# Patient Record
Sex: FEMALE | Race: WHITE | NOT HISPANIC OR LATINO | Employment: FULL TIME | URBAN - NONMETROPOLITAN AREA
[De-identification: names, ages, dates, MRNs, and addresses within clinical notes are randomized per-mention and may not be internally consistent; named-entity substitution may affect disease eponyms.]

---

## 2019-05-16 ENCOUNTER — OFFICE VISIT (OUTPATIENT)
Dept: NEUROLOGY | Facility: CLINIC | Age: 59
End: 2019-05-16

## 2019-05-16 VITALS
BODY MASS INDEX: 25.61 KG/M2 | WEIGHT: 150 LBS | DIASTOLIC BLOOD PRESSURE: 80 MMHG | SYSTOLIC BLOOD PRESSURE: 130 MMHG | HEART RATE: 72 BPM | HEIGHT: 64 IN

## 2019-05-16 DIAGNOSIS — R13.10 DYSPHAGIA, UNSPECIFIED TYPE: ICD-10-CM

## 2019-05-16 DIAGNOSIS — R43.0 ANOSMIA: ICD-10-CM

## 2019-05-16 DIAGNOSIS — R43.2 DYSGEUSIA: ICD-10-CM

## 2019-05-16 DIAGNOSIS — R20.0 FACIAL NUMBNESS: ICD-10-CM

## 2019-05-16 DIAGNOSIS — G50.1 ATYPICAL FACIAL PAIN: Primary | ICD-10-CM

## 2019-05-16 PROCEDURE — 99203 OFFICE O/P NEW LOW 30 MIN: CPT | Performed by: PHYSICIAN ASSISTANT

## 2019-05-16 RX ORDER — PNV NO.95/FERROUS FUM/FOLIC AC 28MG-0.8MG
TABLET ORAL DAILY
COMMUNITY

## 2019-05-16 RX ORDER — VERAPAMIL HYDROCHLORIDE 240 MG/1
240 TABLET, FILM COATED, EXTENDED RELEASE ORAL NIGHTLY
COMMUNITY
End: 2021-03-04 | Stop reason: SDUPTHER

## 2019-06-13 ENCOUNTER — TELEPHONE (OUTPATIENT)
Dept: NEUROLOGY | Facility: CLINIC | Age: 59
End: 2019-06-13

## 2019-06-13 NOTE — TELEPHONE ENCOUNTER
Called and left pt a VM stating that the MRI order was placed today and she should be receiving a call from scheduling soon.

## 2019-06-13 NOTE — TELEPHONE ENCOUNTER
Patient states she was supposed to be scheduled for an MRI and when she called scheduling to ask about it they did not have the order. Please put order in and call patient.

## 2019-06-13 NOTE — PROGRESS NOTES
Subjective   Candida Powell is a 59 y.o. female is being seen for consultation today at the request of Fidel Hoskins MD    The patient has had symptoms of a neurologic nature for greater than a year.  Patient initially suffered from some headache which involved then into numbness in a trigeminal distribution on the left.  Patient relates that her sense of smell and taste has been altered.  She has had occasional swallowing issues which have not been progressive.  She has not had visual symptoms.  There is been no loss of consciousness.  She has not had any advanced imaging of her brain.      Neurologic Problem   The patient's primary symptoms include focal sensory loss, focal weakness and weakness. This is a new problem. The current episode started more than 1 year ago. The neurological problem developed gradually. The problem is unchanged. There was left-sided focality noted. Associated symptoms include fatigue and headaches. Past treatments include nothing.        The following portions of the patient's history were reviewed and updated as appropriate: allergies, current medications, past family history, past medical history, past social history, past surgical history and problem list.    Review of Systems   Constitutional: Positive for fatigue.   HENT: Positive for trouble swallowing.    Eyes: Negative.    Respiratory: Negative.    Cardiovascular: Negative.    Gastrointestinal: Negative.    Endocrine: Negative.    Genitourinary: Negative.    Musculoskeletal: Negative.    Skin: Negative.    Allergic/Immunologic: Negative.    Neurological: Positive for focal weakness, weakness, numbness and headache.        Anosmia, dysgeusia   Hematological: Negative.    Psychiatric/Behavioral: Negative.        Objective   Physical Exam   Constitutional: She is oriented to person, place, and time.   HENT:   Head: Normocephalic.   Right Ear: Hearing and external ear normal.   Left Ear: Hearing and external ear normal.    Nose: Nose normal.   Mouth/Throat: Uvula is midline, oropharynx is clear and moist and mucous membranes are normal.   Eyes: Conjunctivae, EOM and lids are normal. Pupils are equal, round, and reactive to light.   Fundoscopic exam:       The right eye shows no hemorrhage and no papilledema. The right eye shows red reflex.        The left eye shows no hemorrhage and no papilledema. The left eye shows red reflex.   Neck: Trachea normal, normal range of motion, full passive range of motion without pain and phonation normal. Neck supple. No JVD present. Carotid bruit is not present.   Cardiovascular: Normal rate, regular rhythm and normal heart sounds.   Pulmonary/Chest: Effort normal and breath sounds normal.   Neurological: She is alert and oriented to person, place, and time. She has normal strength. She displays no atrophy and no tremor. A cranial nerve deficit and sensory deficit is present. She exhibits normal muscle tone. Coordination and gait normal. GCS eye subscore is 4. GCS verbal subscore is 5. GCS motor subscore is 6.   Reflex Scores:       Tricep reflexes are 2+ on the right side and 2+ on the left side.       Bicep reflexes are 2+ on the right side and 2+ on the left side.       Brachioradialis reflexes are 2+ on the right side and 2+ on the left side.       Patellar reflexes are 2+ on the right side and 2+ on the left side.       Achilles reflexes are 2+ on the right side and 2+ on the left side.  Discrepancy in light touch from left to right, subjective diminished since of smell and altered taste.   Skin: Skin is warm, dry and intact.   Psychiatric: She has a normal mood and affect. Her speech is normal and behavior is normal. Judgment and thought content normal. Cognition and memory are normal.   Nursing note and vitals reviewed.        Assessment/Plan   Candida was seen today for neurologic problem.    Diagnoses and all orders for this visit:    Atypical facial pain  -     MRI Brain With & Without  Contrast; Future    Facial numbness  -     MRI Brain With & Without Contrast; Future    Dysphagia, unspecified type  -     MRI Brain With & Without Contrast; Future    Dysgeusia  -     MRI Brain With & Without Contrast; Future    Anosmia  -     MRI Brain With & Without Contrast; Future    Concern for intracranial lesion such as olfactory groove meningioma or similar.  Exam is not revealing but the symptoms are substantial.    Today's findings, recommendations, follow-up recommendations the patient questions are reviewed in detail.           Dictated utilizing Dragon dictation.

## 2019-06-28 ENCOUNTER — HOSPITAL ENCOUNTER (OUTPATIENT)
Dept: MRI IMAGING | Facility: HOSPITAL | Age: 59
Discharge: HOME | End: 2019-06-28
Admitting: PHYSICIAN ASSISTANT

## 2019-06-28 DIAGNOSIS — R43.0 ANOSMIA: ICD-10-CM

## 2019-06-28 DIAGNOSIS — R13.10 DYSPHAGIA, UNSPECIFIED TYPE: ICD-10-CM

## 2019-06-28 DIAGNOSIS — G50.1 ATYPICAL FACIAL PAIN: ICD-10-CM

## 2019-06-28 DIAGNOSIS — R43.2 DYSGEUSIA: ICD-10-CM

## 2019-06-28 DIAGNOSIS — R20.0 FACIAL NUMBNESS: ICD-10-CM

## 2019-06-28 LAB — CREAT BLDA-MCNC: 0.8 MG/DL (ref 0.6–1.3)

## 2019-06-28 PROCEDURE — 0 GADOBENATE DIMEGLUMINE 529 MG/ML SOLUTION: Performed by: PHYSICIAN ASSISTANT

## 2019-06-28 PROCEDURE — A9577 INJ MULTIHANCE: HCPCS | Performed by: PHYSICIAN ASSISTANT

## 2019-06-28 PROCEDURE — 82565 ASSAY OF CREATININE: CPT

## 2019-06-28 PROCEDURE — 70553 MRI BRAIN STEM W/O & W/DYE: CPT

## 2019-06-28 RX ADMIN — GADOBENATE DIMEGLUMINE 20 ML: 529 INJECTION, SOLUTION INTRAVENOUS at 08:25

## 2019-07-03 DIAGNOSIS — I63.9 BRAINSTEM STROKE (HCC): Primary | ICD-10-CM

## 2019-07-09 DIAGNOSIS — I63.9 BRAINSTEM STROKE (HCC): Primary | ICD-10-CM

## 2019-07-15 ENCOUNTER — HOSPITAL ENCOUNTER (OUTPATIENT)
Dept: CARDIOLOGY | Facility: HOSPITAL | Age: 59
Discharge: HOME | End: 2019-07-15
Admitting: PHYSICIAN ASSISTANT

## 2019-07-15 ENCOUNTER — HOSPITAL ENCOUNTER (OUTPATIENT)
Dept: CARDIOLOGY | Facility: HOSPITAL | Age: 59
Discharge: HOME | End: 2019-07-15

## 2019-07-15 VITALS
HEIGHT: 64 IN | SYSTOLIC BLOOD PRESSURE: 137 MMHG | WEIGHT: 150 LBS | BODY MASS INDEX: 25.61 KG/M2 | DIASTOLIC BLOOD PRESSURE: 75 MMHG

## 2019-07-15 DIAGNOSIS — I63.9 BRAINSTEM STROKE (HCC): ICD-10-CM

## 2019-07-15 LAB
BH CV ECHO MEAS - AO MAX PG (FULL): 3.1 MMHG
BH CV ECHO MEAS - AO MAX PG: 7.6 MMHG
BH CV ECHO MEAS - AO MEAN PG (FULL): 2 MMHG
BH CV ECHO MEAS - AO MEAN PG: 4 MMHG
BH CV ECHO MEAS - AO ROOT AREA (BSA CORRECTED): 1.6
BH CV ECHO MEAS - AO ROOT AREA: 5.7 CM^2
BH CV ECHO MEAS - AO ROOT DIAM: 2.7 CM
BH CV ECHO MEAS - AO V2 MAX: 138 CM/SEC
BH CV ECHO MEAS - AO V2 MEAN: 98.5 CM/SEC
BH CV ECHO MEAS - AO V2 VTI: 32 CM
BH CV ECHO MEAS - AVA(I,A): 2.3 CM^2
BH CV ECHO MEAS - AVA(I,D): 2.3 CM^2
BH CV ECHO MEAS - AVA(V,A): 2.2 CM^2
BH CV ECHO MEAS - AVA(V,D): 2.2 CM^2
BH CV ECHO MEAS - BSA(HAYCOCK): 1.8 M^2
BH CV ECHO MEAS - BSA: 1.7 M^2
BH CV ECHO MEAS - BZI_BMI: 25.7 KILOGRAMS/M^2
BH CV ECHO MEAS - BZI_METRIC_HEIGHT: 162.6 CM
BH CV ECHO MEAS - BZI_METRIC_WEIGHT: 68 KG
BH CV ECHO MEAS - EDV(CUBED): 58 ML
BH CV ECHO MEAS - EDV(MOD-SP4): 59.7 ML
BH CV ECHO MEAS - EDV(TEICH): 64.7 ML
BH CV ECHO MEAS - EF(CUBED): 77 %
BH CV ECHO MEAS - EF(MOD-SP4): 67.7 %
BH CV ECHO MEAS - EF(TEICH): 69.8 %
BH CV ECHO MEAS - ESV(CUBED): 13.3 ML
BH CV ECHO MEAS - ESV(MOD-SP4): 19.3 ML
BH CV ECHO MEAS - ESV(TEICH): 19.5 ML
BH CV ECHO MEAS - FS: 38.8 %
BH CV ECHO MEAS - IVS/LVPW: 1
BH CV ECHO MEAS - IVSD: 0.87 CM
BH CV ECHO MEAS - LA DIMENSION: 3.1 CM
BH CV ECHO MEAS - LA/AO: 1.1
BH CV ECHO MEAS - LAT PEAK E' VEL: 7.7 CM/SEC
BH CV ECHO MEAS - LV DIASTOLIC VOL/BSA (35-75): 34.5 ML/M^2
BH CV ECHO MEAS - LV MASS(C)D: 98.6 GRAMS
BH CV ECHO MEAS - LV MASS(C)DI: 56.9 GRAMS/M^2
BH CV ECHO MEAS - LV MAX PG: 4.5 MMHG
BH CV ECHO MEAS - LV MEAN PG: 2 MMHG
BH CV ECHO MEAS - LV SYSTOLIC VOL/BSA (12-30): 11.2 ML/M^2
BH CV ECHO MEAS - LV V1 MAX: 106 CM/SEC
BH CV ECHO MEAS - LV V1 MEAN: 69.1 CM/SEC
BH CV ECHO MEAS - LV V1 VTI: 25.7 CM
BH CV ECHO MEAS - LVIDD: 3.9 CM
BH CV ECHO MEAS - LVIDS: 2.4 CM
BH CV ECHO MEAS - LVLD AP4: 7.1 CM
BH CV ECHO MEAS - LVLS AP4: 6.1 CM
BH CV ECHO MEAS - LVOT AREA (M): 2.8 CM^2
BH CV ECHO MEAS - LVOT AREA: 2.8 CM^2
BH CV ECHO MEAS - LVOT DIAM: 1.9 CM
BH CV ECHO MEAS - LVPWD: 0.86 CM
BH CV ECHO MEAS - MED PEAK E' VEL: 8.49 CM/SEC
BH CV ECHO MEAS - MV A MAX VEL: 66 CM/SEC
BH CV ECHO MEAS - MV DEC TIME: 0.18 SEC
BH CV ECHO MEAS - MV E MAX VEL: 95.1 CM/SEC
BH CV ECHO MEAS - MV E/A: 1.4
BH CV ECHO MEAS - RAP SYSTOLE: 5 MMHG
BH CV ECHO MEAS - RVSP: 22.3 MMHG
BH CV ECHO MEAS - SI(AO): 105.9 ML/M^2
BH CV ECHO MEAS - SI(CUBED): 25.8 ML/M^2
BH CV ECHO MEAS - SI(LVOT): 42.1 ML/M^2
BH CV ECHO MEAS - SI(MOD-SP4): 23.3 ML/M^2
BH CV ECHO MEAS - SI(TEICH): 26.1 ML/M^2
BH CV ECHO MEAS - SV(AO): 183.2 ML
BH CV ECHO MEAS - SV(CUBED): 44.6 ML
BH CV ECHO MEAS - SV(LVOT): 72.9 ML
BH CV ECHO MEAS - SV(MOD-SP4): 40.4 ML
BH CV ECHO MEAS - SV(TEICH): 45.2 ML
BH CV ECHO MEAS - TR MAX VEL: 208 CM/SEC
BH CV ECHO MEASUREMENTS AVERAGE E/E' RATIO: 11.75
LEFT ATRIUM VOLUME INDEX: 26.4 ML/M2
LEFT ATRIUM VOLUME: 45.6 CM3
MAXIMAL PREDICTED HEART RATE: 159 BPM
STRESS TARGET HR: 135 BPM

## 2019-07-15 PROCEDURE — 0296T HC EXT ECG > 48HR TO 21 DAY RCRD W/CONECT INTL RCRD: CPT

## 2019-07-15 PROCEDURE — 93306 TTE W/DOPPLER COMPLETE: CPT | Performed by: INTERNAL MEDICINE

## 2019-07-15 PROCEDURE — 93306 TTE W/DOPPLER COMPLETE: CPT

## 2019-08-09 PROCEDURE — 0298T PR EXT ECG > 48HR TO 21 DAY REVIEW AND INTERPRETATN: CPT | Performed by: INTERNAL MEDICINE

## 2019-08-29 ENCOUNTER — OFFICE VISIT (OUTPATIENT)
Dept: NEUROLOGY | Facility: CLINIC | Age: 59
End: 2019-08-29

## 2019-08-29 VITALS
DIASTOLIC BLOOD PRESSURE: 62 MMHG | HEIGHT: 64 IN | WEIGHT: 153 LBS | SYSTOLIC BLOOD PRESSURE: 112 MMHG | BODY MASS INDEX: 26.12 KG/M2 | RESPIRATION RATE: 18 BRPM | HEART RATE: 72 BPM

## 2019-08-29 DIAGNOSIS — I63.9 BRAINSTEM STROKE (HCC): Primary | ICD-10-CM

## 2019-08-29 PROBLEM — I49.9 CARDIAC ARRHYTHMIA: Status: ACTIVE | Noted: 2019-08-29

## 2019-08-29 PROBLEM — E78.2 MIXED HYPERLIPIDEMIA: Status: ACTIVE | Noted: 2019-08-29

## 2019-08-29 PROCEDURE — 99214 OFFICE O/P EST MOD 30 MIN: CPT | Performed by: PHYSICIAN ASSISTANT

## 2019-08-29 RX ORDER — ROSUVASTATIN CALCIUM 5 MG/1
5 TABLET, COATED ORAL DAILY
COMMUNITY
End: 2021-03-04 | Stop reason: SDUPTHER

## 2019-09-24 NOTE — PROGRESS NOTES
Subjective   Candida Powell is a 59 y.o. female is here today for follow-up.    The patient has had symptoms of a neurologic nature for greater than a year.  Patient initially suffered from some headache which involved then into numbness in a trigeminal distribution on the left.  Patient relates that her sense of smell and taste has been altered.  She has had occasional swallowing issues which have not been progressive.  She has not had visual symptoms.    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  Small vessel disease is also noted.  Echocardiogram is unrevealing.  Holter monitor has not revealed any arrhythmia.      Neurologic Problem   The patient's primary symptoms include focal sensory loss, focal weakness and weakness. This is a new problem. The current episode started more than 1 year ago. The neurological problem developed gradually. The problem is unchanged. There was left-sided focality noted. Associated symptoms include fatigue and headaches. Past treatments include nothing.        The following portions of the patient's history were reviewed and updated as appropriate: allergies, current medications, past family history, past medical history, past social history, past surgical history and problem list.    Review of Systems   Constitutional: Positive for fatigue.   HENT: Positive for trouble swallowing.    Eyes: Negative.    Respiratory: Negative.    Cardiovascular: Negative.    Gastrointestinal: Negative.    Endocrine: Negative.    Genitourinary: Negative.    Musculoskeletal: Negative.    Skin: Negative.    Allergic/Immunologic: Negative.    Neurological: Positive for focal weakness, weakness, numbness and headache.        Anosmia, dysgeusia   Hematological: Negative.    Psychiatric/Behavioral: Negative.        Objective   Physical Exam   Constitutional: She is oriented to person, place, and time.   HENT:   Head: Normocephalic.   Right Ear: Hearing and external ear normal.   Left Ear:  Hearing and external ear normal.   Nose: Nose normal.   Mouth/Throat: Uvula is midline, oropharynx is clear and moist and mucous membranes are normal.   Eyes: Conjunctivae, EOM and lids are normal. Pupils are equal, round, and reactive to light.   Neck: Trachea normal, normal range of motion and phonation normal. No JVD present. Carotid bruit is not present.   Cardiovascular: Normal rate, regular rhythm and normal heart sounds.   Pulmonary/Chest: Effort normal and breath sounds normal.   Neurological: She is alert and oriented to person, place, and time. She has normal strength. She displays no atrophy and no tremor. A cranial nerve deficit and sensory deficit is present. She exhibits normal muscle tone. Coordination and gait normal. GCS eye subscore is 4. GCS verbal subscore is 5. GCS motor subscore is 6.   Reflex Scores:       Tricep reflexes are 2+ on the right side and 2+ on the left side.       Bicep reflexes are 2+ on the right side and 2+ on the left side.       Brachioradialis reflexes are 2+ on the right side and 2+ on the left side.       Patellar reflexes are 2+ on the right side and 2+ on the left side.       Achilles reflexes are 2+ on the right side and 2+ on the left side.  Discrepancy in light touch from left to right, subjective diminished since of smell and altered taste.   Skin: Skin is warm, dry and intact.   Psychiatric: She has a normal mood and affect. Her speech is normal and behavior is normal. Judgment and thought content normal. Cognition and memory are normal.   Nursing note and vitals reviewed.        Assessment/Plan   Candida was seen today for neurologic problem.    Diagnoses and all orders for this visit:    Brainstem stroke (CMS/HCC)  -     aspirin 81 MG tablet; Take 1 tablet by mouth Daily.    Today's findings and recommendations are reviewed with the patient.  The patient's questions and concerns are also reviewed.  Previous imaging studies are reviewed in the room with the  patient.      20 minutes of a 25 minute outpatient visit was spent in counseling and coordination of care today.           Dictated utilizing Dragon dictation.

## 2020-02-18 ENCOUNTER — TELEPHONE (OUTPATIENT)
Dept: NEUROLOGY | Facility: CLINIC | Age: 60
End: 2020-02-18

## 2020-02-18 NOTE — TELEPHONE ENCOUNTER
----- Message from AIXA Connor sent at 2/18/2020 10:13 AM CST -----  1) if acute changes offer ER evaluation 2) I can see her sooner       ----- Message -----  From: Rhona Gross LPN  Sent: 2/18/2020   9:05 AM CST  To: AIXA Connor    Candida said last night her right arm felt like it was asleep, then had a burning sensation.  She also had pain shoot through her head and felt like she was going to pass out.  Her arm still has a burning sensation today.  She is concerned and said something is wrong.    [phone]. [phone]

## 2020-02-25 ENCOUNTER — TRANSCRIBE ORDERS (OUTPATIENT)
Dept: ADMINISTRATIVE | Facility: HOSPITAL | Age: 60
End: 2020-02-25

## 2020-02-25 DIAGNOSIS — M50.10 CERVICAL DISC DISORDER WITH RADICULOPATHY OF CERVICAL REGION: Primary | ICD-10-CM

## 2020-02-25 DIAGNOSIS — M50.10 CERVICAL DISC DISORDER WITH RADICULOPATHY, UNSPECIFIED CERVICAL REGION: Primary | ICD-10-CM

## 2020-02-28 ENCOUNTER — HOSPITAL ENCOUNTER (OUTPATIENT)
Dept: GENERAL RADIOLOGY | Facility: HOSPITAL | Age: 60
Discharge: HOME | End: 2020-02-28
Admitting: FAMILY MEDICINE

## 2020-02-28 DIAGNOSIS — M50.10 CERVICAL DISC DISORDER WITH RADICULOPATHY OF CERVICAL REGION: ICD-10-CM

## 2020-02-28 PROCEDURE — 72050 X-RAY EXAM NECK SPINE 4/5VWS: CPT

## 2020-03-02 ENCOUNTER — HOSPITAL ENCOUNTER (OUTPATIENT)
Dept: NEUROLOGY | Facility: HOSPITAL | Age: 60
Discharge: HOME | End: 2020-03-02
Admitting: FAMILY MEDICINE

## 2020-03-02 PROCEDURE — 95886 MUSC TEST DONE W/N TEST COMP: CPT

## 2020-03-02 PROCEDURE — 95911 NRV CNDJ TEST 9-10 STUDIES: CPT

## 2020-03-05 ENCOUNTER — OFFICE VISIT (OUTPATIENT)
Dept: NEUROLOGY | Facility: CLINIC | Age: 60
End: 2020-03-05

## 2020-03-05 VITALS
HEART RATE: 83 BPM | HEIGHT: 64 IN | BODY MASS INDEX: 26.12 KG/M2 | SYSTOLIC BLOOD PRESSURE: 122 MMHG | DIASTOLIC BLOOD PRESSURE: 74 MMHG | WEIGHT: 153 LBS

## 2020-03-05 DIAGNOSIS — I63.9 BRAINSTEM STROKE (HCC): Primary | ICD-10-CM

## 2020-03-05 PROCEDURE — 99213 OFFICE O/P EST LOW 20 MIN: CPT | Performed by: PHYSICIAN ASSISTANT

## 2020-03-25 NOTE — PROGRESS NOTES
Subjective   Candida Powell is a 59 y.o. female is here today for follow-up.    The patient has had symptoms of a neurologic nature for greater than a year.  Patient initially suffered from some headache which involved then into numbness in a trigeminal distribution on the left.  Patient relates that her sense of smell and taste has been altered.  She has had occasional swallowing issues which have not been progressive.  She has not had visual symptoms.    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  Small vessel disease is also noted.  Echocardiogram is unrevealing.  Holter monitor has not revealed any arrhythmia.    Neurologic Problem   The patient's primary symptoms include focal sensory loss, focal weakness and weakness. This is a new problem. The current episode started more than 1 year ago. The neurological problem developed gradually. The problem is unchanged. There was left-sided focality noted. Associated symptoms include fatigue, headaches and light-headedness. Past treatments include nothing.        The following portions of the patient's history were reviewed and updated as appropriate: allergies, current medications, past family history, past medical history, past social history, past surgical history and problem list.    Review of Systems   Constitutional: Positive for fatigue.   HENT: Positive for trouble swallowing.    Eyes: Negative.    Respiratory: Negative.    Cardiovascular: Negative.    Gastrointestinal: Negative.    Endocrine: Negative.    Genitourinary: Negative.    Musculoskeletal: Negative.    Skin: Negative.    Allergic/Immunologic: Negative.    Neurological: Positive for focal weakness, weakness, light-headedness, numbness and headache.        Anosmia, dysgeusia   Hematological: Negative.    Psychiatric/Behavioral: Negative.        Objective   Physical Exam   Constitutional: She is oriented to person, place, and time. Vital signs are normal.   HENT:   Head: Normocephalic.    Right Ear: Hearing and external ear normal.   Left Ear: Hearing and external ear normal.   Nose: Nose normal.   Mouth/Throat: Uvula is midline, oropharynx is clear and moist and mucous membranes are normal.   Eyes: Pupils are equal, round, and reactive to light. Conjunctivae, EOM and lids are normal. No scleral icterus.   Neck: Trachea normal, normal range of motion and phonation normal. No JVD present. Carotid bruit is not present.   Cardiovascular: Normal rate, regular rhythm and normal heart sounds.   Pulmonary/Chest: Effort normal and breath sounds normal.   Neurological: She is oriented to person, place, and time. She has normal strength. She displays no atrophy and no tremor. A cranial nerve deficit and sensory deficit is present. She exhibits normal muscle tone. Coordination and gait normal. GCS eye subscore is 4. GCS verbal subscore is 5. GCS motor subscore is 6.   Reflex Scores:       Tricep reflexes are 2+ on the right side and 2+ on the left side.       Bicep reflexes are 2+ on the right side and 2+ on the left side.       Brachioradialis reflexes are 2+ on the right side and 2+ on the left side.       Patellar reflexes are 2+ on the right side and 2+ on the left side.       Achilles reflexes are 2+ on the right side and 2+ on the left side.  Discrepancy in light touch from left to right, subjective diminished since of smell and altered taste.   Skin: Skin is warm, dry and intact.   Psychiatric: She has a normal mood and affect. Her speech is normal and behavior is normal. Judgment and thought content normal. Cognition and memory are normal.   Nursing note and vitals reviewed.        Assessment/Plan   Candida was seen today for stroke.    Diagnoses and all orders for this visit:    Brainstem stroke (CMS/[facility])  -     MRI Brain With & Without Contrast; Future  -     US Carotid Bilateral; Future    Today's findings and recommendations were reviewed with the patient.  The patient has exacerbation of sensory  symptoms which are very reminiscent of her previous symptoms related to her brainstem stroke.  Given the persistence and subjective increase, I have recommended a follow-up MRI of the brain.  I have also recommended a carotid ultrasound.  The patient's condition, expectations, medications as well as the patient's questions and concerns were reviewed in detail.      10 minutes of a 15 minute outpatient visit was spent in counseling and coordination of care today.           Dictated utilizing Dragon dictation.

## 2020-05-08 ENCOUNTER — HOSPITAL ENCOUNTER (OUTPATIENT)
Dept: ULTRASOUND IMAGING | Facility: HOSPITAL | Age: 60
Discharge: HOME | End: 2020-05-08

## 2020-05-08 ENCOUNTER — HOSPITAL ENCOUNTER (OUTPATIENT)
Dept: MRI IMAGING | Facility: HOSPITAL | Age: 60
Discharge: HOME | End: 2020-05-08
Admitting: PHYSICIAN ASSISTANT

## 2020-05-08 DIAGNOSIS — I63.9 BRAINSTEM STROKE (HCC): ICD-10-CM

## 2020-05-08 LAB — CREAT BLDA-MCNC: 0.9 MG/DL (ref 0.6–1.3)

## 2020-05-08 PROCEDURE — A9577 INJ MULTIHANCE: HCPCS | Performed by: PHYSICIAN ASSISTANT

## 2020-05-08 PROCEDURE — 70553 MRI BRAIN STEM W/O & W/DYE: CPT

## 2020-05-08 PROCEDURE — 93880 EXTRACRANIAL BILAT STUDY: CPT | Performed by: SURGERY

## 2020-05-08 PROCEDURE — 93880 EXTRACRANIAL BILAT STUDY: CPT

## 2020-05-08 PROCEDURE — 82565 ASSAY OF CREATININE: CPT

## 2020-05-08 PROCEDURE — 0 GADOBENATE DIMEGLUMINE 529 MG/ML SOLUTION: Performed by: PHYSICIAN ASSISTANT

## 2020-05-08 RX ADMIN — GADOBENATE DIMEGLUMINE 14 ML: 529 INJECTION, SOLUTION INTRAVENOUS at 08:25

## 2020-05-12 ENCOUNTER — TELEPHONE (OUTPATIENT)
Dept: NEUROLOGY | Facility: CLINIC | Age: 60
End: 2020-05-12

## 2020-05-12 NOTE — TELEPHONE ENCOUNTER
Patient called and wanted to know what her test results were from 5/8 and would like for someone to call her back and discuss the results with her.       She had 2 tests completed that day. Can someone please give her a call and discuss the finding with her?       Candida Powell   Cell: [phone]    WORK: [phone] ext. [phone]

## 2020-05-14 ENCOUNTER — TELEPHONE (OUTPATIENT)
Dept: NEUROLOGY | Facility: CLINIC | Age: 60
End: 2020-05-14

## 2020-05-14 NOTE — TELEPHONE ENCOUNTER
----- Message from AIXA Connor sent at 5/13/2020  3:44 PM CDT -----  She needs a follow up soon - either video or here      ----- Message -----  From: Rhona Gross LPN  Sent: 5/13/2020   3:23 PM CDT  To: AIXA Connor    Candida wants to know what is causing her symptoms.  She has tingling, pins and needles, feels like her arm is on fire, and has a severe pain in her head that feels like an electrical current going through it.  She talked to a nurse practitioner at work and they recommended a work up for Lyme disease.   ----- Message -----  From: Dragan Florez PA  Sent: 5/13/2020   2:36 PM CDT  To: Rhona Gross LPN    Carotid arteries show very mild narrowing, left a little more than right, no other treatment than to stay on ASA daily and check again next year.  The MRI is unchanged from previous.      ----- Message -----  From: Rhona Gross LPN  Sent: 5/12/2020   1:26 PM CDT  To: AIXA Connor    Candida would like the carotid ultrasound and MRI results.

## 2020-06-10 ENCOUNTER — APPOINTMENT (OUTPATIENT)
Dept: MRI IMAGING | Facility: HOSPITAL | Age: 60
End: 2020-06-10

## 2020-06-10 ENCOUNTER — APPOINTMENT (OUTPATIENT)
Dept: ULTRASOUND IMAGING | Facility: HOSPITAL | Age: 60
End: 2020-06-10

## 2020-08-31 ENCOUNTER — OFFICE VISIT (OUTPATIENT)
Dept: NEUROLOGY | Facility: CLINIC | Age: 60
End: 2020-08-31

## 2020-08-31 VITALS
HEIGHT: 64 IN | SYSTOLIC BLOOD PRESSURE: 120 MMHG | HEART RATE: 78 BPM | DIASTOLIC BLOOD PRESSURE: 72 MMHG | BODY MASS INDEX: 26.29 KG/M2 | OXYGEN SATURATION: 99 % | WEIGHT: 154 LBS

## 2020-08-31 DIAGNOSIS — I63.9 BRAINSTEM STROKE (HCC): Primary | ICD-10-CM

## 2020-08-31 PROCEDURE — 99214 OFFICE O/P EST MOD 30 MIN: CPT | Performed by: PHYSICIAN ASSISTANT

## 2020-09-07 NOTE — PROGRESS NOTES
Subjective   Candida Powell is a 60 y.o. female is here today for follow-up.    The patient has had symptoms of a neurologic nature for greater than a year.  Patient initially suffered from some headache which involved then into numbness in a trigeminal distribution on the left.  Patient relates that her sense of smell and taste has been altered.  She has had occasional swallowing issues which have not been progressive.  She has not had visual symptoms.    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  Small vessel disease is also noted.  Echocardiogram is unrevealing.  Holter monitor has not revealed any arrhythmia.    Recently the patient has had some issues with an electrical sensation in the face and upper extremity which has resolved completely.  This may be in fact related to her old infarct.    Neurologic Problem   The patient's primary symptoms include focal sensory loss, focal weakness and weakness. This is a new problem. The current episode started more than 1 year ago. The neurological problem developed gradually. The problem is unchanged. There was left-sided focality noted. Associated symptoms include fatigue, headaches and light-headedness. Past treatments include nothing.        The following portions of the patient's history were reviewed and updated as appropriate: allergies, current medications, past family history, past medical history, past social history, past surgical history and problem list.    Review of Systems   Constitutional: Positive for fatigue.   HENT: Positive for trouble swallowing.    Eyes: Negative.    Respiratory: Negative.    Cardiovascular: Negative.    Gastrointestinal: Negative.    Endocrine: Negative.    Genitourinary: Negative.    Musculoskeletal: Negative.    Skin: Negative.    Allergic/Immunologic: Negative.    Neurological: Positive for focal weakness, weakness, light-headedness, numbness and headache.        Anosmia, dysgeusia   Hematological: Negative.     Psychiatric/Behavioral: Negative.          Current Outpatient Medications:   •  aspirin 81 MG tablet, Take 1 tablet by mouth Daily., Disp: , Rfl:   •  Krill Oil Omega-3 500 MG capsule, Take  by mouth Daily., Disp: , Rfl:   •  rosuvastatin (CRESTOR) 5 MG tablet, Take 5 mg by mouth Daily., Disp: , Rfl:   •  verapamil SR (CALAN-SR) 240 MG CR tablet, Take 240 mg by mouth Every Night., Disp: , Rfl:      Objective   Physical Exam   Constitutional: She is oriented to person, place, and time. Vital signs are normal.   HENT:   Head: Normocephalic.   Right Ear: Hearing and external ear normal.   Left Ear: Hearing and external ear normal.   Nose: Nose normal.   Mouth/Throat: Uvula is midline, oropharynx is clear and moist and mucous membranes are normal.   Eyes: Pupils are equal, round, and reactive to light. Conjunctivae, EOM and lids are normal. No scleral icterus.   Neck: Trachea normal, normal range of motion and phonation normal. No JVD present. Carotid bruit is not present.   Cardiovascular: Normal rate, regular rhythm and normal heart sounds.   Pulmonary/Chest: Effort normal and breath sounds normal.   Neurological: She is oriented to person, place, and time. She has normal strength. She displays no atrophy and no tremor. A cranial nerve deficit and sensory deficit is present. She exhibits normal muscle tone. Coordination and gait normal. GCS eye subscore is 4. GCS verbal subscore is 5. GCS motor subscore is 6.   Reflex Scores:       Tricep reflexes are 2+ on the right side and 2+ on the left side.       Bicep reflexes are 2+ on the right side and 2+ on the left side.       Brachioradialis reflexes are 2+ on the right side and 2+ on the left side.       Patellar reflexes are 2+ on the right side and 2+ on the left side.       Achilles reflexes are 2+ on the right side and 2+ on the left side.  Discrepancy in light touch from left to right, subjective diminished since of smell and altered taste.   Skin: Skin is warm,  dry and intact.   Psychiatric: She has a normal mood and affect. Her speech is normal and behavior is normal. Judgment and thought content normal. Cognition and memory are normal.   Nursing note and vitals reviewed.        Assessment/Plan   Candida was seen today for stroke.    Diagnoses and all orders for this visit:    Brainstem stroke (CMS/HCC)    Discussed the patient's symptoms in detail.  Recommend that she continue with aspirin and Crestor.    The nature of her infarct and the potential for resurgent symptoms are reviewed.  Patient also is aware that recurrent stroke is a possibility and we have discussed some signs or symptoms that may be attributable to recurrent stroke.  The patient stroke risk is definitely improved with aspirin and Crestor adherence.    Patient's questions and concerns were also reviewed in detail.  Follow-up is established.      20 minutes of a 25 minute outpatient visit was spent in counseling and coordination of care today.           Dictated utilizing Dragon dictation.

## 2020-10-05 ENCOUNTER — TELEPHONE (OUTPATIENT)
Dept: GASTROENTEROLOGY | Facility: CLINIC | Age: 60
End: 2020-10-05

## 2020-10-05 NOTE — TELEPHONE ENCOUNTER
SPOKE WITH PT ABOUT BEING DUE FOR A REPEAT COLONOSCOPY. SHE SAID SHE IS GOING TO PUT OFF HAVING IT UNTIL AFTER COVID IS OVER.    LETTER SENT TO PCP.

## 2021-02-25 ENCOUNTER — TRANSCRIBE ORDERS (OUTPATIENT)
Dept: ADMINISTRATIVE | Facility: HOSPITAL | Age: 61
End: 2021-02-25

## 2021-02-25 DIAGNOSIS — I69.80 UNSPECIFIED SEQUELAE OF OTHER CEREBROVASCULAR DISEASE: Primary | ICD-10-CM

## 2021-03-01 ENCOUNTER — HOSPITAL ENCOUNTER (OUTPATIENT)
Dept: CT IMAGING | Facility: HOSPITAL | Age: 61
Discharge: HOME | End: 2021-03-01

## 2021-03-01 DIAGNOSIS — I69.80 UNSPECIFIED SEQUELAE OF OTHER CEREBROVASCULAR DISEASE: ICD-10-CM

## 2021-03-01 LAB — CREAT BLDA-MCNC: 0.8 MG/DL (ref 0.6–1.3)

## 2021-03-01 PROCEDURE — 0 IOPAMIDOL PER 1 ML: Performed by: FAMILY MEDICINE

## 2021-03-01 PROCEDURE — 70498 CT ANGIOGRAPHY NECK: CPT

## 2021-03-01 PROCEDURE — 82565 ASSAY OF CREATININE: CPT

## 2021-03-01 PROCEDURE — 70496 CT ANGIOGRAPHY HEAD: CPT

## 2021-03-01 RX ADMIN — IOPAMIDOL 89 ML: 755 INJECTION, SOLUTION INTRAVENOUS at 17:18

## 2021-03-03 ENCOUNTER — TRANSCRIBE ORDERS (OUTPATIENT)
Dept: ADMINISTRATIVE | Facility: HOSPITAL | Age: 61
End: 2021-03-03

## 2021-03-03 DIAGNOSIS — E04.1 NONTOXIC SINGLE THYROID NODULE: Primary | ICD-10-CM

## 2021-03-04 ENCOUNTER — OFFICE VISIT (OUTPATIENT)
Dept: NEUROLOGY | Facility: CLINIC | Age: 61
End: 2021-03-04

## 2021-03-04 ENCOUNTER — HOSPITAL ENCOUNTER (OUTPATIENT)
Dept: ULTRASOUND IMAGING | Facility: HOSPITAL | Age: 61
Discharge: HOME | End: 2021-03-04
Admitting: FAMILY MEDICINE

## 2021-03-04 VITALS
BODY MASS INDEX: 26.29 KG/M2 | HEART RATE: 67 BPM | HEIGHT: 64 IN | SYSTOLIC BLOOD PRESSURE: 122 MMHG | WEIGHT: 154 LBS | OXYGEN SATURATION: 98 % | DIASTOLIC BLOOD PRESSURE: 70 MMHG

## 2021-03-04 DIAGNOSIS — I63.9 BRAINSTEM STROKE (HCC): Primary | ICD-10-CM

## 2021-03-04 DIAGNOSIS — E04.1 NONTOXIC SINGLE THYROID NODULE: ICD-10-CM

## 2021-03-04 DIAGNOSIS — I65.22 ULCERATED ATHEROSCLEROTIC PLAQUE OF LEFT CAROTID ARTERY: ICD-10-CM

## 2021-03-04 PROCEDURE — 76536 US EXAM OF HEAD AND NECK: CPT

## 2021-03-04 PROCEDURE — 99214 OFFICE O/P EST MOD 30 MIN: CPT | Performed by: PHYSICIAN ASSISTANT

## 2021-03-05 ENCOUNTER — TELEPHONE (OUTPATIENT)
Dept: VASCULAR SURGERY | Facility: CLINIC | Age: 61
End: 2021-03-05

## 2021-03-05 NOTE — TELEPHONE ENCOUNTER
Spoke with patient and advised of upcoming appointment with Dr. Payne and mailed reminder to patient confirmed address on file.

## 2021-03-17 ENCOUNTER — TELEPHONE (OUTPATIENT)
Dept: VASCULAR SURGERY | Facility: CLINIC | Age: 61
End: 2021-03-17

## 2021-03-17 NOTE — TELEPHONE ENCOUNTER
Left message reminding Mrs Powell of her appointment for Thursday, March 18th, 2021 at 930 am with Dr Payne. Also advised Mrs Powell if she had any questions, concerns, or needs to reschedule to please call the office at [phone].        Test Comp [id number]

## 2021-03-18 ENCOUNTER — OFFICE VISIT (OUTPATIENT)
Dept: VASCULAR SURGERY | Facility: CLINIC | Age: 61
End: 2021-03-18

## 2021-03-18 VITALS
DIASTOLIC BLOOD PRESSURE: 84 MMHG | HEART RATE: 86 BPM | SYSTOLIC BLOOD PRESSURE: 136 MMHG | OXYGEN SATURATION: 95 % | WEIGHT: 155 LBS | BODY MASS INDEX: 26.46 KG/M2 | HEIGHT: 64 IN

## 2021-03-18 DIAGNOSIS — I65.23 BILATERAL CAROTID ARTERY STENOSIS: Primary | ICD-10-CM

## 2021-03-18 DIAGNOSIS — E78.2 MIXED HYPERLIPIDEMIA: ICD-10-CM

## 2021-03-18 DIAGNOSIS — I10 ESSENTIAL HYPERTENSION: ICD-10-CM

## 2021-03-18 PROCEDURE — 99214 OFFICE O/P EST MOD 30 MIN: CPT | Performed by: NURSE PRACTITIONER

## 2021-03-23 ENCOUNTER — HOSPITAL ENCOUNTER (OUTPATIENT)
Dept: MRI IMAGING | Facility: HOSPITAL | Age: 61
Discharge: HOME | End: 2021-03-23
Admitting: PHYSICIAN ASSISTANT

## 2021-03-23 DIAGNOSIS — I63.9 BRAINSTEM STROKE (HCC): ICD-10-CM

## 2021-03-23 PROCEDURE — 70553 MRI BRAIN STEM W/O & W/DYE: CPT

## 2021-03-23 PROCEDURE — A9577 INJ MULTIHANCE: HCPCS | Performed by: PHYSICIAN ASSISTANT

## 2021-03-23 PROCEDURE — 0 GADOBENATE DIMEGLUMINE 529 MG/ML SOLUTION: Performed by: PHYSICIAN ASSISTANT

## 2021-03-23 RX ADMIN — GADOBENATE DIMEGLUMINE 14 ML: 529 INJECTION, SOLUTION INTRAVENOUS at 08:42

## 2021-03-25 ENCOUNTER — TELEPHONE (OUTPATIENT)
Dept: NEUROLOGY | Facility: CLINIC | Age: 61
End: 2021-03-25

## 2021-03-25 NOTE — TELEPHONE ENCOUNTER
----- Message from AIXA Connor sent at 3/25/2021  3:16 PM CDT -----  Her MRI is the same - no new or worrisome findings

## 2021-03-25 NOTE — TELEPHONE ENCOUNTER
11/28/2017       RE: Mitesh Tovar  [address] [place] [address]     Dear Colleague,    Thank you for referring your patient, Mitesh Tovar, to the [facility] at [facility]. Please see a copy of my visit note below.    [facility]  November 28, 2017         Assessment and Plan:   Mitesh Tovar is a 32 year old male with cystic fibrosis.    1. CF lung disease with normal spirometry:  The majority of Mitesh's symptoms remain consistent with more of a bronchospasm.  He does continue to use Symbicort b.i.d. as well as an albuterol inhaler prior to exercise.  He says that this has given him excellent disease stability.  He has had no exacerbations or any concerns during the last year's time.  Today he did have a chest x-ray that was reviewed by me and Radiology which showed a normal-appearing chest.  His last sputum culture grew out only normal ariel.  I do await today's results.  Today we discussed continuing on the albuterol as needed for exercise.  He certainly could consider backing off on his Symbicort if he is overall feeling that his symptoms are stable.  He can certainly go to once daily or try to come off of it completely.  If he does not tolerate it, we discussed reinitiating the therapy.  We talked about long-term side effects of the use of these inhaler therapies, and I do think his risk is minimal at this time.    2.  CFTR modulator therapy.  Mitesh and I once again discussed the use of ivacaftor.  Given his overall disease stability and normal chest x-ray, we have chosen again not to initiate this therapy.  We also talked about new therapies that certainly may be available for patients with CF in the future including triple combination therapy.   3.  Health care maintenance.  Mitesh declined flu shot today.  He did obtain annual studies which were reviewed by me with him.   Candida notified.    He did have a low vitamin D level.  I think it would be best if he initiated vitamin D supplementation.  Additionally, he did have a mildly elevated LDL and certainly could address this as well.    4.  Low bone mineral density.  Mitesh did show evidence of bone mineral density below the mean.  I have again recommended that initiation of vitamin D would be appropriate.   5.  Psychosocial.  Mitesh is .  He continues to work as an .  He reports that things are going quite well.  He did run the [place] in 4 hours 28 minutes.   6. Pancreatic sufficiency:  The patient has no new symptoms consistent with worsening malabsorption.  The plan is to initiate vitamin supplementation.    Carole Ward MD MPH   of Medicine  Pulmonary, Allergy, Critical Care and Sleep Medicine      Interval History:     Mitesh denies any cough or sputum production.  He is only using inhaler therapy.  There is clearly no indication for other forms of bronchial drainage therapy at this time.  He has no shortness of breath.          Review of Systems:     All questionnaires were reviewed by me today with the patient.  Review of Systems     Constitutional:  Negative for fever, chills, weight loss, weight gain, fatigue, decreased appetite, night sweats, recent stressors, height gain, height loss, post-operative complications, incisional pain, hallucinations, increased energy, hyperactivity and confused.   HENT:  Negative for ear pain, hearing loss, tinnitus, nosebleeds, trouble swallowing, hoarse voice, mouth sores, sore throat, ear discharge, tooth pain, gum tenderness, taste disturbance, smell disturbance, hearing aid, bleeding gums, dry mouth, sinus pain, sinus congestion and neck mass.    Eyes:  Negative for double vision, pain, redness, eye pain, decreased vision, eye watering, eye bulging, eye dryness, flashing lights, spots, floaters, strabismus, tunnel vision, jaundice and eye irritation.    Respiratory:   Negative for cough, hemoptysis, sputum production, shortness of breath, wheezing, sleep disturbances due to breathing, snores loudly, respiratory pain, dyspnea on exertion, cough disturbing sleep and postural dyspnea.    Cardiovascular:  Negative for chest pain, dyspnea on exertion, palpitations, orthopnea, claudication, leg swelling, fingers/toes turn blue, hypertension, hypotension, syncope, history of heart murmur, chest pain on exertion, chest pain at rest, pacemaker, few scattered varicosities, leg pain, sleep disturbances due to breathing, tachycardia, light-headedness, exercise intolerance and edema.   Gastrointestinal:  Negative for heartburn, nausea, vomiting, abdominal pain, diarrhea, constipation, blood in stool, melena, rectal pain, bloating, hemorrhoids, bowel incontinence, jaundice, rectal bleeding, coffee ground emesis and change in stool.   Genitourinary:  Negative for bladder incontinence, dysuria, urgency, hematuria, flank pain, difficulty urinating, nocturia, voiding less frequently, scrotal pain, ulcerations, penile discharge, male genitourinary complaint and reduced libido.   Musculoskeletal:  Negative for myalgias, back pain, joint swelling, arthralgias, stiffness, muscle cramps, neck pain, bone pain, muscle weakness and fracture.   Skin:  Negative for nail changes, itching, poor wound healing, rash, hair changes, skin changes, acne, warts, poor wound healing, scarring, flaky skin, Raynaud's phenomenon, sensitivity to sunlight and skin thickening.   Neurological:  Negative for dizziness, tingling, tremors, speech change, seizures, loss of consciousness, weakness, light-headedness, numbness, headaches, disturbances in coordination, extremity numbness, memory loss, difficulty walking and paralysis.   Endo/Heme:  Negative for anemia, swollen glands and bruises/bleeds easily.   Psychiatric/Behavioral:  Negative for depression, hallucinations, memory loss, decreased concentration,  mood swings and panic attacks.    Endocrine:  Negative for altered temperature regulation, polyphagia, polydipsia, unwanted hair growth and change in facial hair.           Past Medical and Surgical History:     Past Medical History:   Diagnosis Date     Bilateral external ear infections     frequent as a child     Congenital absence of vas deferens      Pneumonia     x 2, during college years     Seasonal allergies      Past Surgical History:   Procedure Laterality Date     MYRINGOTOMY, INSERT TUBE BILATERAL, COMBINED  1985, 1983           Family History:     Family History   Problem Relation Age of Onset     Genitourinary Problems Brother      CBAVD, no CF testing     Hypertension Mother      Hypertension Father      DIABETES Father      CEREBROVASCULAR DISEASE Maternal Grandmother      CANCER Paternal Grandfather      type unknown            Social History:     Social History     Social History     Marital status:      Spouse name: N/A     Number of children: N/A     Years of education: N/A     Occupational History     Not on file.     Social History Main Topics     Smoking status: Never Smoker     Smokeless tobacco: Never Used     Alcohol use Not on file     Drug use: Not on file     Sexual activity: Not on file     Other Topics Concern     Not on file     Social History Narrative    Patient lives with his wife in a house in [place], [place].  He works full time as an .  He has minimal alcohol consumption.  No tobacco exposure.  He has no pets.  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            Medications:     Current Outpatient Prescriptions   Medication     budesonide-formoterol (SYMBICORT) 80-4.5 MCG/ACT Inhaler     albuterol (PROAIR HFA/PROVENTIL HFA/VENTOLIN HFA) 108 (90 BASE) MCG/ACT Inhaler     [DISCONTINUED] budesonide-formoterol (SYMBICORT) 80-4.5 MCG/ACT Inhaler     [DISCONTINUED] albuterol (PROAIR HFA/PROVENTIL HFA/VENTOLIN HFA) 108 (90 BASE) MCG/ACT Inhaler     No current  "facility-administered medications for this visit.             Physical Exam:   /75  Pulse 56  Resp 18  Ht 1.727 m (5' 8\")  Wt 88 kg (194 lb 0.1 oz)  SpO2 98%  BMI 29.5 kg/m2    Constitutional:   Awake, alert and in no apparent distress     Eyes:   nonicteric     ENT:   oral mucosa moist without lesions, normal tm bilaterally, bilateral mucosal erythema     Neck:   Supple without supraclavicular or cervical lymphadenopathy     Lungs:   Good air flow.  No crackles. No rhonchi.  No wheezes.     Cardiovascular:   Normal S1 and S2.  RRR.  No murmur, gallop or rub.     Abdomen:   NABS, soft, nontender, nondistended.  No HSM.     Musculoskeletal:   No edema, no digital clubbing present     Neurologic:   Alert and conversant.     Skin:   Warm, dry.  No rash on limited exam.             Data:   All laboratory and imaging data reviewed.    Cystic Fibrosis Culture  Specimen Description   Date Value Ref Range Status   11/28/2017 Throat  Final   11/17/2015 Throat  Final   11/18/2014 Throat  Final    Culture Micro   Date Value Ref Range Status   11/28/2017 PENDING  Preliminary   11/17/2015 Normal ariel  Final   11/18/2014 Normal ariel  Light growth Serratia marcescens   (A)  Final        Recent Results (from the past 168 hour(s))   Glucose in a Series: Draw Time Zero    Collection Time: 11/28/17  7:25 AM   Result Value Ref Range    Glucose 88 70 - 99 mg/dL   Insulin in a Series: Draw Time Zero    Collection Time: 11/28/17  7:25 AM   Result Value Ref Range    Insulin 5.6 3 - 25 mU/L   Basic metabolic panel    Collection Time: 11/28/17  7:25 AM   Result Value Ref Range    Sodium 140 133 - 144 mmol/L    Potassium 3.8 3.4 - 5.3 mmol/L    Chloride 106 94 - 109 mmol/L    Carbon Dioxide 25 20 - 32 mmol/L    Anion Gap 8 3 - 14 mmol/L    Glucose 86 70 - 99 mg/dL    Urea Nitrogen 14 7 - 30 mg/dL    Creatinine 0.87 0.66 - 1.25 mg/dL    GFR Estimate >90 >60 mL/min/1.7m2    GFR Estimate If Black >90 >60 mL/min/1.7m2    Calcium 8.9 " 8.5 - 10.1 mg/dL   Lipid Profile    Collection Time: 11/28/17  7:25 AM   Result Value Ref Range    Cholesterol 190 <200 mg/dL    Triglycerides 144 <150 mg/dL    HDL Cholesterol 53 >39 mg/dL    LDL Cholesterol Calculated 107 (H) <100 mg/dL    Non HDL Cholesterol 136 (H) <130 mg/dL   Albumin level    Collection Time: 11/28/17  7:25 AM   Result Value Ref Range    Albumin 4.0 3.4 - 5.0 g/dL   Alkaline phosphatase    Collection Time: 11/28/17  7:25 AM   Result Value Ref Range    Alkaline Phosphatase 64 40 - 150 U/L   AST    Collection Time: 11/28/17  7:25 AM   Result Value Ref Range    AST 24 0 - 45 U/L   Iron    Collection Time: 11/28/17  7:25 AM   Result Value Ref Range    Iron 49 35 - 180 ug/dL   GGT    Collection Time: 11/28/17  7:25 AM   Result Value Ref Range    GGT 18 0 - 75 U/L   Hemoglobin A1c    Collection Time: 11/28/17  7:25 AM   Result Value Ref Range    Hemoglobin A1C 4.9 4.3 - 6.0 %   IgA    Collection Time: 11/28/17  7:25 AM   Result Value Ref Range     70 - 380 mg/dL   IgG    Collection Time: 11/28/17  7:25 AM   Result Value Ref Range     695 - 1620 mg/dL   IgM    Collection Time: 11/28/17  7:25 AM   Result Value Ref Range    IGM 53 (L) 60 - 265 mg/dL   INR    Collection Time: 11/28/17  7:25 AM   Result Value Ref Range    INR 1.12 0.86 - 1.14   Magnesium    Collection Time: 11/28/17  7:25 AM   Result Value Ref Range    Magnesium 2.0 1.6 - 2.3 mg/dL   Phosphorus    Collection Time: 11/28/17  7:25 AM   Result Value Ref Range    Phosphorus 3.0 2.5 - 4.5 mg/dL   Protein total    Collection Time: 11/28/17  7:25 AM   Result Value Ref Range    Protein Total 7.1 6.8 - 8.8 g/dL   TSH with free T4 reflex    Collection Time: 11/28/17  7:25 AM   Result Value Ref Range    TSH 1.82 0.40 - 4.00 mU/L   Vitamin D Deficiency    Collection Time: 11/28/17  7:25 AM   Result Value Ref Range    Vitamin D Deficiency screening 18 (L) 20 - 75 ug/L   CBC with platelets differential    Collection Time: 11/28/17  7:25  AM   Result Value Ref Range    WBC 5.6 4.0 - 11.0 10e9/L    RBC Count 4.97 4.4 - 5.9 10e12/L    Hemoglobin 15.2 13.3 - 17.7 g/dL    Hematocrit 44.1 40.0 - 53.0 %    MCV 89 78 - 100 fl    MCH 30.6 26.5 - 33.0 pg    MCHC 34.5 31.5 - 36.5 g/dL    RDW 12.1 10.0 - 15.0 %    Platelet Count 238 150 - 450 10e9/L    Diff Method Automated Method     % Neutrophils 43.1 %    % Lymphocytes 45.8 %    % Monocytes 7.3 %    % Eosinophils 2.9 %    % Basophils 0.7 %    % Immature Granulocytes 0.2 %    Nucleated RBCs 0 0 /100    Absolute Neutrophil 2.4 1.6 - 8.3 10e9/L    Absolute Lymphocytes 2.6 0.8 - 5.3 10e9/L    Absolute Monocytes 0.4 0.0 - 1.3 10e9/L    Absolute Eosinophils 0.2 0.0 - 0.7 10e9/L    Absolute Basophils 0.0 0.0 - 0.2 10e9/L    Abs Immature Granulocytes 0.0 0 - 0.4 10e9/L    Absolute Nucleated RBC 0.0    Erythrocyte sedimentation rate auto    Collection Time: 11/28/17  7:25 AM   Result Value Ref Range    Sed Rate 4 0 - 15 mm/h   Routine UA with microscopic    Collection Time: 11/28/17  7:30 AM   Result Value Ref Range    Color Urine Yellow     Appearance Urine Clear     Glucose Urine Negative NEG^Negative mg/dL    Bilirubin Urine Negative NEG^Negative    Ketones Urine Negative NEG^Negative mg/dL    Specific Gravity Urine 1.014 1.003 - 1.035    Blood Urine Negative NEG^Negative    pH Urine 5.0 5.0 - 7.0 pH    Protein Albumin Urine Negative NEG^Negative mg/dL    Urobilinogen mg/dL 0.0 0.0 - 2.0 mg/dL    Nitrite Urine Negative NEG^Negative    Leukocyte Esterase Urine Negative NEG^Negative    Source Midstream Urine     WBC Urine 1 0 - 2 /HPF    RBC Urine 0 0 - 2 /HPF    Mucous Urine Present (A) NEG^Negative /LPF   Microalbumin quantitative random urine    Collection Time: 11/28/17  7:30 AM   Result Value Ref Range    Creatinine Urine 129 mg/dL    Albumin Urine mg/L <5 mg/L    Albumin Urine mg/g Cr Unable to calculate due to low value 0 - 17 mg/g Cr   Glucose in a Series: Draw +30 minutes    Collection Time: 11/28/17  8:00  AM   Result Value Ref Range    Glucose 140 (H) 70 - 99 mg/dL   Insulin in a Series: Draw +30 minutes    Collection Time: 11/28/17  8:00 AM   Result Value Ref Range    Insulin 34.9 (H) 3 - 25 mU/L   Glucose in a Series: Draw +60 minutes    Collection Time: 11/28/17  8:30 AM   Result Value Ref Range    Glucose 112 (H) 70 - 99 mg/dL   Insulin in a Series: Draw +60 minutes    Collection Time: 11/28/17  8:30 AM   Result Value Ref Range    Insulin 24.6 3 - 25 mU/L   Glucose in a Series: Draw +90 minutes    Collection Time: 11/28/17  9:00 AM   Result Value Ref Range    Glucose 97 70 - 99 mg/dL   Insulin in a Series: Draw +90 minutes    Collection Time: 11/28/17  9:00 AM   Result Value Ref Range    Insulin 20.4 3 - 25 mU/L   Glucose in a Series: Draw +120 minutes    Collection Time: 11/28/17  9:30 AM   Result Value Ref Range    Glucose 95 70 - 99 mg/dL   Insulin in a Series: Draw +120 minutes    Collection Time: 11/28/17  9:30 AM   Result Value Ref Range    Insulin 20.7 3 - 25 mU/L   Glucose by meter    Collection Time: 11/28/17  9:33 AM   Result Value Ref Range    Glucose 95 70 - 99 mg/dL   General PFT Lab (Please always keep checked)    Collection Time: 11/28/17 10:45 AM   Result Value Ref Range    FVC-Pred 5.03 L    FVC-Pre 4.80 L    FVC-%Pred-Pre 95 %    FEV1-Pre 3.64 L    FEV1-%Pred-Pre 88 %    FEV1FVC-Pred 82 %    FEV1FVC-Pre 76 %    FEFMax-Pred 9.85 L/sec    FEFMax-Pre 10.72 L/sec    FEFMax-%Pred-Pre 108 %    FEF2575-Pred 4.15 L/sec    FEF2575-Pre 2.96 L/sec    RPE0174-%Pred-Pre 71 %    ExpTime-Pre 7.48 sec    FIFMax-Pre 8.62 L/sec    FEV1FEV6-Pred 83 %    FEV1FEV6-Pre 78 %   Cystic Fibrosis Culture Aerob Bacterial    Collection Time: 11/28/17 11:20 AM   Result Value Ref Range    Specimen Description Throat     Special Requests Specimen collected in eSwab transport (white cap)     Culture Micro PENDING        PFT: The spirometry is normal.  When compared to 11/22/2016, the FEV1 and FVC have decreased.    Answers for  HPI/ROS submitted by the patient on 11/22/2017   Annual Exam:  Frequency of exercise:: 4-5 days/week  Duration of exercise:: 30-45 minutes      Again, thank you for allowing me to participate in the care of your patient.      Sincerely,    Carole Ward MD

## 2021-03-28 NOTE — PROGRESS NOTES
Subjective   Candida Powell is a 60 y.o. female is here today for follow-up.    The patient has concerning waxing and waning of her symptoms.  Including some new symptoms including left-sided headache discomfort with temporal discomfort, lightheadedness occurring episodically over the last year.  When the symptoms occur, they do disrupt her activities significantly.  She has expressed these concerns to her family doctor who obtained a CTA of the carotids and brain recently with the CTA showing a tiny ulcerated plaque the left internal carotid.  Overall no significant findings on angiography.       The following portions of the patient's history were reviewed and updated as appropriate: allergies, current medications, past family history, past medical history, past social history, past surgical history and problem list.    Review of Systems   Constitutional: Positive for activity change and fatigue.   Eyes: Negative.    Respiratory: Negative.    Cardiovascular: Negative.    Gastrointestinal: Negative.    Musculoskeletal: Positive for gait problem.   Skin: Negative.    Neurological: Positive for weakness, numbness and headache.   Psychiatric/Behavioral: Negative.          Current Outpatient Medications:   •  aspirin 81 MG tablet, Take 1 tablet by mouth Daily., Disp: , Rfl:   •  Krill Oil Omega-3 500 MG capsule, Take  by mouth Daily., Disp: , Rfl:   •  rosuvastatin (CRESTOR) 5 MG tablet, Take 1 tablet by mouth every day at bedtime., Disp: 90 tablet, Rfl: 3  •  verapamil SR (CALAN-SR) 240 MG CR tablet, Take 1 tablet by mouth Daily., Disp: 90 tablet, Rfl: 3  •  vitamin D (ERGOCALCIFEROL) 1.25 MG (74774 UT) capsule capsule, Take 1 capsule by mouth once weekly., Disp: 4 capsule, Rfl: 5     Objective   Physical Exam  Vitals and nursing note reviewed.   HENT:      Head: Normocephalic.      Right Ear: Hearing and external ear normal.      Left Ear: Hearing and external ear normal.      Nose: Nose normal.      Mouth/Throat:       Pharynx: Oropharynx is clear.   Eyes:      General: Lids are normal. Vision grossly intact. Gaze aligned appropriately. No scleral icterus.     Extraocular Movements: Extraocular movements intact.      Conjunctiva/sclera: Conjunctivae normal.      Pupils: Pupils are equal, round, and reactive to light.      Visual Fields: Right eye visual fields normal and left eye visual fields normal.   Neck:      Vascular: No carotid bruit or JVD.      Trachea: Trachea and phonation normal.   Cardiovascular:      Rate and Rhythm: Normal rate.      Heart sounds: Normal heart sounds.   Pulmonary:      Effort: Pulmonary effort is normal.      Breath sounds: Normal breath sounds.   Musculoskeletal:      Cervical back: Normal range of motion.   Skin:     General: Skin is warm and dry.   Neurological:      Mental Status: She is alert and oriented to person, place, and time.      GCS: GCS eye subscore is 4. GCS verbal subscore is 5. GCS motor subscore is 6.      Cranial Nerves: Cranial nerves are intact.      Sensory: Sensation is intact.      Motor: Motor function is intact.      Coordination: Coordination is intact.      Gait: Gait is intact.      Deep Tendon Reflexes: Reflexes are normal and symmetric.   Psychiatric:         Attention and Perception: Attention and perception normal.         Mood and Affect: Mood and affect normal.         Speech: Speech normal.         Behavior: Behavior normal.         Thought Content: Thought content normal.         Cognition and Memory: Cognition and memory normal.         Judgment: Judgment normal.       MRI Brain With & Without Contrast (05/08/2020 08:47)      CT Angiogram Head (03/01/2021 17:29)     CT Angiogram Carotids (03/01/2021 17:28)       Assessment/Plan   Diagnoses and all orders for this visit:    1. Brainstem stroke (CMS/HCC) (Primary)  -     MRI Brain With & Without Contrast; Future    2. Ulcerated atherosclerotic plaque of left carotid artery  -     Ambulatory Referral to  Vascular Surgery      This patient with a previous diagnosed right medullary infarct and worsening symptoms inclusive of sensory changes, headache which may be attributable to resurgence of her medullary symptoms but might also represent knee pathology and therefore should be evaluated with imaging.    A recent CTA obtained by her primary care physician, Dr. Gato MD reveals a tiny ulcerated plaque, left internal carotid artery at its origin.  Given the nature of this finding we will refer to vascular surgery for any needed follow-up.    Today's findings and recommendations were reviewed with the patient.  It is recommended that she continue with aspirin daily.  It is also recommended that she continue with Crestor.    30 minutes was spent in face-to-face evaluation and management of this patient today.             Dictated utilizing Dragon dictation.

## 2021-03-30 NOTE — PROGRESS NOTES
03/18/2021      Dragan Florez PA  [address]  [address]  [place] [address]    Candida Powell  1960    Chief Complaint   Patient presents with   • Establish Care     Referred over by Dragan KRUSE for  Ulcerated Atherosclerotic Plaque of Left Carotid Artery. Test [id number] CT pad angiogram carotids. Patient denies any stroke like symptoms.    • Non Smoker     Patient is a Non Smoker    • Med Management     Verbally verified medications with patient        Dear Dragan Florez,*:      HPI  I had the pleasure of seeing your patient Candida Powell in the office today.  Thank you kindly for this consultation.  As you recall, Candida Powell is a 60 y.o.  female who you are currently following for left-sided headache and lightheadedness occurring over the past year.  Her primary care provider obtained a CTA of the carotids and brain, which I did review.  She denies any strokelike symptoms.  She is maintained on aspirin and Crestor.     Past Medical History:   Diagnosis Date   • Hypertension    • SVT (supraventricular tachycardia) (CMS/formerly [facility])        Past Surgical History:   Procedure Laterality Date   • NO PAST SURGERIES         Family History   Problem Relation Age of Onset   • No Known Problems Mother    • No Known Problems Father        Social History     Socioeconomic History   • Marital status:      Spouse name: Not on file   • Number of children: Not on file   • Years of education: Not on file   • Highest education level: Not on file   Tobacco Use   • Smoking status: Never Smoker   • Smokeless tobacco: Never Used   Substance and Sexual Activity   • Alcohol use: No   • Drug use: No   • Sexual activity: Defer       No Known Allergies      Current Outpatient Medications:   •  aspirin 81 MG tablet, Take 1 tablet by mouth Daily., Disp: , Rfl:   •  Krill Oil Omega-3 500 MG capsule, Take  by mouth Daily., Disp: , Rfl:   •  rosuvastatin (CRESTOR) 5 MG tablet, Take 1 tablet by  "mouth every day at bedtime., Disp: 90 tablet, Rfl: 3  •  verapamil SR (CALAN-SR) 240 MG CR tablet, Take 1 tablet by mouth Daily., Disp: 90 tablet, Rfl: 3  •  vitamin D (ERGOCALCIFEROL) 1.25 MG (08095 UT) capsule capsule, Take 1 capsule by mouth once weekly., Disp: 4 capsule, Rfl: 5     Review of Systems   Constitutional: Negative.    Eyes: Negative.    Respiratory: Negative.    Cardiovascular: Negative.    Gastrointestinal: Negative.    Endocrine: Negative.    Genitourinary: Negative.    Musculoskeletal: Negative.    Skin: Negative.    Allergic/Immunologic: Negative.    Neurological: Positive for light-headedness and headaches.   Hematological: Negative.    Psychiatric/Behavioral: Negative.        /84 (BP Location: Right arm, Patient Position: Sitting, Cuff Size: Adult)   Pulse 86   Ht 162.6 cm (64\")   Wt 70.3 kg (155 lb)   SpO2 95%   BMI 26.61 kg/m²   Physical Exam  Vitals and nursing note reviewed.   Constitutional:       General: She is not in acute distress.     Appearance: She is well-developed. She is not diaphoretic.   HENT:      Head: Normocephalic and atraumatic.   Eyes:      General: No scleral icterus.     Pupils: Pupils are equal, round, and reactive to light.   Neck:      Thyroid: No thyromegaly.      Vascular: No carotid bruit or JVD.   Cardiovascular:      Rate and Rhythm: Normal rate and regular rhythm.      Pulses: Normal pulses.      Heart sounds: Normal heart sounds and S2 normal. No murmur heard.   No friction rub. No gallop.    Pulmonary:      Effort: Pulmonary effort is normal.      Breath sounds: Normal breath sounds.   Abdominal:      General: Bowel sounds are normal.      Palpations: Abdomen is soft.   Musculoskeletal:         General: Normal range of motion.      Cervical back: Normal range of motion and neck supple.   Skin:     General: Skin is warm and dry.   Neurological:      Mental Status: She is alert and oriented to person, place, and time.      Cranial Nerves: No cranial " nerve deficit.   Psychiatric:         Behavior: Behavior normal.         Thought Content: Thought content normal.         Judgment: Judgment normal.       EXAMINATION:   CT ANGIOGRAM CAROTIDS-  3/1/2021 5:34 PM CST     HISTORY: CT ANGIOGRAM OF THE NECK 3/1/2021 5:26 PM CST     HISTORY: HISTORY OF ISCHEMIC CVA WITH RESIDUAL DEFICIT;  I69.80-Unspecified sequelae of other cerebrovascular disease     COMPARISON: None      DLP: 331 mGy cm     In order to have a CT radiation dose as low as reasonably achievable,  Automated Exposure Control was utilized for adjustment of the mA and/or  KV according to patient size.     TECHNIQUE: Following the uneventful administration of Isovue contrast,  serial helical tomographic images of the neck were obtained following  angiogram protocol. Multiplanar MIP and 3D reconstructions were provided  for review.       FINDINGS:      Angiogram:   The aortic arch and visualized great vessels are patent, without  evidence of aneurysm, dissection, vessel cutoff, or flow limiting  stenosis.      Right common carotid artery is visualized and is bifurcation is normal.     Left common carotid artery is visualized. A small ulcerated plaque is  present at the origin of the left internal carotid artery. There is no  associated stenosis. This is best visualized on series 602 image 12 and  series 3 image 51.      Posteriorly, the vertebral arteries and visualized basilar artery  demonstrate no aneurysm, dissection, vessel cutoff, or flow-limiting  stenosis.      Other findings: The airway is patent. There is no mass or significant  lymphadenopathy. The lung apices are clear. Mucous cyst is present  medial wall left maxillary antrum. Paranasal sinuses are otherwise  unremarkable.. There is nonuniform attenuation of the thyroid gland.  There is a nodule in the posterior right lobe of thyroid gland. Is  approximately a 7 mm nodule..     IMPRESSION:  Impression:   1. NASCET criteria were utilized to evaluate  stenoses.   2. Tiny ulcerated plaque left internal carotid artery at its origin with  no associated stenosis.  2. Right internal carotid artery is unremarkable. There is no associated  stenosis.  3. 7 mm nodule posterior right lobe of thyroid gland. Nonemergent  ultrasound may be considered.     This report was finalized on 03/01/2021 17:39 by Dr. Edward Guo MD.      Patient Active Problem List   Diagnosis   • Brainstem stroke (CMS/HCC)   • Mixed hyperlipidemia   • Cardiac arrhythmia   • Hypertension         ICD-10-CM ICD-9-CM   1. Bilateral carotid artery stenosis  I65.23 433.10     433.30   2. Mixed hyperlipidemia  E78.2 272.2   3. Essential hypertension  I10 401.9       Plan: After thoroughly evaluating Candida Powell, I believe the best course of action is to remain conservative from a vascular surgery standpoint.  Currently, she is doing well and denies any strokelike symptoms.  I did review her testing as well as reviewed with Dr. Payne.  There is a small ulcerated plaque present, but no vascular intervention needed.  We will see her back in 1 year with repeat noninvasive testing for continued surveillance, including a carotid duplex. I did discuss vascular risk factors as they pertain to the progression of vascular disease including controlling her hypertension and hyperlipidemia.  Her blood pressure is stable on her current medication regimen.  She is maintained on Crestor for her hyperlipidemia.  Patient's Body mass index is 26.61 kg/m². BMI is above normal parameters. Recommendations include: educational material. The patient can continue taking their current medication regimen as previously planned.  This was all discussed in full with complete understanding.    Thank you for allowing me to participate in the care of your patient.  Please do not hesitate with any questions or concerns.  I will keep you aware of any further encounters with Candida Powell.        Sincerely yours,         Myrna  Franco, APRN

## 2021-04-01 ENCOUNTER — IMMUNIZATION (OUTPATIENT)
Dept: VACCINE CLINIC | Facility: HOSPITAL | Age: 61
End: 2021-04-01

## 2021-04-01 PROCEDURE — 91301 HC SARSCO02 VAC 100MCG/0.5ML IM: CPT | Performed by: OBSTETRICS & GYNECOLOGY

## 2021-04-01 PROCEDURE — 0011A: CPT | Performed by: OBSTETRICS & GYNECOLOGY

## 2021-04-29 ENCOUNTER — IMMUNIZATION (OUTPATIENT)
Dept: VACCINE CLINIC | Facility: HOSPITAL | Age: 61
End: 2021-04-29

## 2021-04-29 PROCEDURE — 0012A: CPT | Performed by: OBSTETRICS & GYNECOLOGY

## 2021-04-29 PROCEDURE — 91301 HC SARSCO02 VAC 100MCG/0.5ML IM: CPT | Performed by: OBSTETRICS & GYNECOLOGY

## 2021-06-10 ENCOUNTER — OFFICE VISIT (OUTPATIENT)
Dept: NEUROLOGY | Facility: CLINIC | Age: 61
End: 2021-06-10

## 2021-06-10 VITALS
OXYGEN SATURATION: 98 % | HEART RATE: 76 BPM | WEIGHT: 156 LBS | BODY MASS INDEX: 26.63 KG/M2 | HEIGHT: 64 IN | DIASTOLIC BLOOD PRESSURE: 78 MMHG | SYSTOLIC BLOOD PRESSURE: 122 MMHG

## 2021-06-10 DIAGNOSIS — I63.9 BRAINSTEM STROKE (HCC): Primary | ICD-10-CM

## 2021-06-10 PROCEDURE — 99213 OFFICE O/P EST LOW 20 MIN: CPT | Performed by: PHYSICIAN ASSISTANT

## 2021-06-10 NOTE — PROGRESS NOTES
Subjective   Candida Powell is a 61 y.o. female is here today for follow-up.    The patient has concerning waxing and waning of her symptoms.  Including some new symptoms including left-sided headache discomfort with temporal discomfort, lightheadedness occurring episodically over the last year.  When the symptoms occur, they do disrupt her activities significantly.  She has expressed these concerns to her family doctor who obtained a CTA of the carotids and brain recently with the CTA showing a tiny ulcerated plaque the left internal carotid.  Overall no significant findings on angiography.    Has been feeling improved since April of this year.  Continues with some dysesthetic sensation down the right side.       The following portions of the patient's history were reviewed and updated as appropriate: allergies, current medications, past family history, past medical history, past social history, past surgical history and problem list.    Review of Systems   Constitutional: Positive for activity change and fatigue.   Eyes: Negative.    Respiratory: Negative.    Cardiovascular: Negative.    Gastrointestinal: Negative.    Musculoskeletal: Positive for gait problem.   Skin: Negative.    Neurological: Positive for weakness, numbness and headache.   Psychiatric/Behavioral: Negative.          Current Outpatient Medications:   •  aspirin 81 MG tablet, Take 1 tablet by mouth Daily., Disp: , Rfl:   •  Krill Oil Omega-3 500 MG capsule, Take  by mouth Daily., Disp: , Rfl:   •  rosuvastatin (CRESTOR) 5 MG tablet, Take 1 tablet by mouth every day at bedtime., Disp: 90 tablet, Rfl: 3  •  verapamil SR (CALAN-SR) 240 MG CR tablet, Take 1 tablet by mouth Daily., Disp: 90 tablet, Rfl: 3  •  vitamin D (ERGOCALCIFEROL) 1.25 MG (38889 UT) capsule capsule, Take 1 capsule by mouth once weekly., Disp: 4 capsule, Rfl: 5     Objective   Physical Exam  Vitals and nursing note reviewed.   HENT:      Head: Normocephalic.      Right Ear:  Hearing and external ear normal.      Left Ear: Hearing and external ear normal.      Nose: Nose normal.      Mouth/Throat:      Pharynx: Oropharynx is clear.   Eyes:      General: Lids are normal. Vision grossly intact. Gaze aligned appropriately. No scleral icterus.     Extraocular Movements: Extraocular movements intact.      Conjunctiva/sclera: Conjunctivae normal.      Pupils: Pupils are equal, round, and reactive to light.      Visual Fields: Right eye visual fields normal and left eye visual fields normal.   Neck:      Vascular: No carotid bruit or JVD.      Trachea: Trachea and phonation normal.   Cardiovascular:      Rate and Rhythm: Normal rate.      Heart sounds: Normal heart sounds.   Pulmonary:      Effort: Pulmonary effort is normal.      Breath sounds: Normal breath sounds.   Musculoskeletal:      Cervical back: Normal range of motion.   Skin:     General: Skin is warm and dry.   Neurological:      Mental Status: She is alert and oriented to person, place, and time.      GCS: GCS eye subscore is 4. GCS verbal subscore is 5. GCS motor subscore is 6.      Cranial Nerves: Cranial nerves are intact.      Sensory: Sensation is intact.      Motor: Motor function is intact.      Coordination: Coordination is intact.      Gait: Gait is intact.      Deep Tendon Reflexes: Reflexes are normal and symmetric.   Psychiatric:         Attention and Perception: Attention and perception normal.         Mood and Affect: Mood and affect normal.         Speech: Speech normal.         Behavior: Behavior normal.         Thought Content: Thought content normal.         Cognition and Memory: Cognition and memory normal.         Judgment: Judgment normal.           Assessment/Plan   Diagnoses and all orders for this visit:    1. Brainstem stroke (CMS/HCC) (Primary)      Previous studies, today's findings and recommendations are reviewed in detail with the patient.  Recommend continuation of aspirin and Crestor  unchanged.    Follow-up is established.             Dictated utilizing Dragon dictation.

## 2021-08-13 DIAGNOSIS — E55.9 VITAMIN D DEFICIENCY, UNSPECIFIED: ICD-10-CM

## 2021-08-16 RX ORDER — ERGOCALCIFEROL 1.25 MG/1
CAPSULE ORAL
Qty: 4 CAPSULE | Refills: 5 | Status: SHIPPED | OUTPATIENT
Start: 2021-08-16 | End: 2022-02-14 | Stop reason: SDUPTHER

## 2021-12-14 ENCOUNTER — OFFICE VISIT (OUTPATIENT)
Dept: NEUROLOGY | Facility: CLINIC | Age: 61
End: 2021-12-14

## 2021-12-14 VITALS
SYSTOLIC BLOOD PRESSURE: 120 MMHG | BODY MASS INDEX: 25.95 KG/M2 | OXYGEN SATURATION: 99 % | HEIGHT: 64 IN | WEIGHT: 152 LBS | DIASTOLIC BLOOD PRESSURE: 72 MMHG | HEART RATE: 80 BPM

## 2021-12-14 DIAGNOSIS — I63.9 BRAINSTEM STROKE (HCC): Primary | ICD-10-CM

## 2021-12-14 DIAGNOSIS — G50.1 ATYPICAL FACIAL PAIN: ICD-10-CM

## 2021-12-14 PROCEDURE — 99213 OFFICE O/P EST LOW 20 MIN: CPT | Performed by: PHYSICIAN ASSISTANT

## 2021-12-14 NOTE — PROGRESS NOTES
Subjective   Candida Powell is a 61 y.o. female is here today for follow-up.    The patient has concerning waxing and waning of her symptoms.  Including some new symptoms including left-sided headache discomfort with temporal discomfort, lightheadedness occurring episodically over the last year.  When the symptoms occur, they do disrupt her activities significantly.  She has expressed these concerns to her family doctor who obtained a CTA of the carotids and brain recently with the CTA showing a tiny ulcerated plaque the left internal carotid.  Overall no significant findings on angiography.    Has been feeling improved since April of this year.  Continues with some dysesthetic sensation down the right side.       The following portions of the patient's history were reviewed and updated as appropriate: allergies, current medications, past family history, past medical history, past social history, past surgical history and problem list.    Review of Systems   Constitutional: Positive for fatigue.   HENT: Positive for trouble swallowing.    Eyes: Negative.    Respiratory: Negative.    Cardiovascular: Negative.    Gastrointestinal: Negative.    Endocrine: Negative.    Genitourinary: Negative.    Musculoskeletal: Negative.    Skin: Negative.    Allergic/Immunologic: Negative.    Neurological: Positive for weakness, light-headedness, numbness and headache.        Anosmia, dysgeusia   Hematological: Negative.    Psychiatric/Behavioral: Negative.          Current Outpatient Medications:   •  aspirin 81 MG tablet, Take 1 tablet by mouth Daily., Disp: , Rfl:   •  carBAMazepine XR (TEGretol  XR) 100 MG 12 hr tablet, Take 1 tablet by mouth Every 12 (Twelve) Hours., Disp: 60 tablet, Rfl: 5  •  Krill Oil Omega-3 500 MG capsule, Take  by mouth Daily., Disp: , Rfl:   •  rosuvastatin (CRESTOR) 5 MG tablet, Take 1 tablet by mouth every day at bedtime., Disp: 90 tablet, Rfl: 3  •  verapamil SR (CALAN-SR) 240 MG CR tablet, Take 1  tablet by mouth Daily., Disp: 90 tablet, Rfl: 3  •  vitamin D (ERGOCALCIFEROL) 1.25 MG (07540 UT) capsule capsule, Take 1 capsule by mouth once weekly., Disp: 4 capsule, Rfl: 5     Objective   Physical Exam  Vitals and nursing note reviewed.   HENT:      Head: Normocephalic.      Right Ear: Hearing and external ear normal.      Left Ear: Hearing and external ear normal.      Nose: Nose normal.      Mouth/Throat:      Pharynx: Oropharynx is clear.   Eyes:      General: Lids are normal. Vision grossly intact. Gaze aligned appropriately. No scleral icterus.     Extraocular Movements: Extraocular movements intact.      Conjunctiva/sclera: Conjunctivae normal.      Pupils: Pupils are equal, round, and reactive to light.      Visual Fields: Right eye visual fields normal and left eye visual fields normal.   Neck:      Vascular: No carotid bruit or JVD.      Trachea: Trachea and phonation normal.   Cardiovascular:      Rate and Rhythm: Normal rate.      Heart sounds: Normal heart sounds.   Pulmonary:      Effort: Pulmonary effort is normal.      Breath sounds: Normal breath sounds.   Musculoskeletal:      Cervical back: Normal range of motion.   Skin:     General: Skin is warm and dry.   Neurological:      Mental Status: She is alert and oriented to person, place, and time.      GCS: GCS eye subscore is 4. GCS verbal subscore is 5. GCS motor subscore is 6.      Cranial Nerves: Cranial nerves are intact.      Sensory: Sensation is intact.      Motor: Motor function is intact.      Coordination: Coordination is intact.      Gait: Gait is intact.      Deep Tendon Reflexes: Reflexes are normal and symmetric.   Psychiatric:         Attention and Perception: Attention and perception normal.         Mood and Affect: Mood and affect normal.         Speech: Speech normal.         Behavior: Behavior normal.         Thought Content: Thought content normal.         Cognition and Memory: Cognition and memory normal.         Judgment:  Judgment normal.           Assessment/Plan   Diagnoses and all orders for this visit:    1. Brainstem stroke (HCC) (Primary)    2. Atypical facial pain      The patient has been placed on Tegretol and correspondingly has had significant improvement in dysesthetic sensations along the left side of her face and head.  I certainly recommend that she continue with this.  I believe the syndrome is more related to her stroke and it would be to classic trigeminal neuralgia.    Today's findings and recommendations are reviewed in detail with the patient a follow-up appointment is scheduled.               Dictated utilizing Dragon dictation.

## 2022-02-14 DIAGNOSIS — E55.9 VITAMIN D DEFICIENCY, UNSPECIFIED: ICD-10-CM

## 2022-02-14 RX ORDER — ERGOCALCIFEROL 1.25 MG/1
CAPSULE ORAL
Qty: 4 CAPSULE | Refills: 5 | Status: SHIPPED | OUTPATIENT
Start: 2022-02-14 | End: 2022-08-08 | Stop reason: SDUPTHER

## 2022-03-02 ENCOUNTER — TELEPHONE (OUTPATIENT)
Dept: VASCULAR SURGERY | Facility: CLINIC | Age: 62
End: 2022-03-02

## 2022-03-02 NOTE — TELEPHONE ENCOUNTER
Pt expressed understanding of appointment change with Dr. Payne. I will mail a new reminder today .

## 2022-03-03 ENCOUNTER — HOSPITAL ENCOUNTER (OUTPATIENT)
Dept: ULTRASOUND IMAGING | Facility: HOSPITAL | Age: 62
Discharge: HOME | End: 2022-03-03
Admitting: NURSE PRACTITIONER

## 2022-03-03 DIAGNOSIS — I65.23 BILATERAL CAROTID ARTERY STENOSIS: ICD-10-CM

## 2022-03-03 PROCEDURE — 93880 EXTRACRANIAL BILAT STUDY: CPT | Performed by: SURGERY

## 2022-03-03 PROCEDURE — 93880 EXTRACRANIAL BILAT STUDY: CPT

## 2022-03-07 ENCOUNTER — TELEPHONE (OUTPATIENT)
Dept: VASCULAR SURGERY | Facility: CLINIC | Age: 62
End: 2022-03-07

## 2022-03-16 NOTE — PROGRESS NOTES
"3/24/2022        Fidel Hoskins MD  [address] [facility] [place] [address]      Candida Powell  1960    Chief Complaint   Patient presents with   • Carotid Artery Disease     1 year follow-up with Test Comp 03/03/22.  Pt denies any stroke-like symptoms.   • Non-Smoker     Pt verified non-smoker.         Dear Fidel Hoskins MD        HPI  I had the pleasure of seeing your patient Candida oPwell in the office today.    As you recall, Candida Powell is a 61 y.o.  female who you are following for routine health maintenance.  Currently she is doing well and denies any strokelike symptoms.  She is maintained on aspirin, fish oil, and Crestor.  She did have noninvasive testing performed, which I did review in office.      Review of Systems   Constitutional: Negative.    HENT: Negative.    Eyes: Negative.    Respiratory: Negative.    Cardiovascular: Negative.    Gastrointestinal: Negative.    Endocrine: Negative.    Genitourinary: Negative.    Musculoskeletal: Negative.    Skin: Negative.    Allergic/Immunologic: Negative.    Neurological: Negative.    Hematological: Negative.    Psychiatric/Behavioral: Negative.    All other systems reviewed and are negative.       /86 (BP Location: Left arm, Patient Position: Sitting, Cuff Size: Adult)   Pulse 86   Resp 18   Ht 162.6 cm (64\")   Wt 70.8 kg (156 lb)   SpO2 98%   BMI 26.78 kg/m²   Physical Exam  Vitals and nursing note reviewed.   Constitutional:       General: She is not in acute distress.     Appearance: Normal appearance. She is well-developed and overweight. She is not diaphoretic.   HENT:      Head: Normocephalic and atraumatic.   Eyes:      General: No scleral icterus.     Pupils: Pupils are equal, round, and reactive to light.   Neck:      Thyroid: No thyromegaly.      Vascular: No carotid bruit or JVD.   Cardiovascular:      Rate and Rhythm: Normal rate and regular rhythm.      Pulses: Normal pulses.      Heart sounds: Normal heart " sounds and S2 normal. No murmur heard.    No friction rub. No gallop.   Pulmonary:      Effort: Pulmonary effort is normal.      Breath sounds: Normal breath sounds.   Abdominal:      General: Bowel sounds are normal.      Palpations: Abdomen is soft.   Musculoskeletal:         General: Normal range of motion.      Cervical back: Normal range of motion and neck supple.   Skin:     General: Skin is warm and dry.   Neurological:      General: No focal deficit present.      Mental Status: She is alert and oriented to person, place, and time.      Cranial Nerves: No cranial nerve deficit.   Psychiatric:         Mood and Affect: Mood normal.         Behavior: Behavior normal. Behavior is cooperative.         Thought Content: Thought content normal.         Judgment: Judgment normal.        Diagnostic data:  US Carotid Bilateral    Result Date: 3/3/2022  Narrative: History: Carotid occlusive disease      Impression: Impression: 1. There is less than 50% stenosis of the right internal carotid artery. 2. There is less than 50% stenosis of the left internal carotid artery. 3. Antegrade flow is demonstrated in bilateral vertebral arteries.  Comments: Bilateral carotid vertebral arterial duplex scan was performed.  Grayscale imaging shows intimal thickening and calcified elements at the carotid bifurcation. The right internal carotid artery peak systolic velocity is 95.6 cm/sec. The end-diastolic velocity is 31.8 cm/sec. The right ICA/CCA ratio is approximately 0.8 . These findings correlate with less than 50% stenosis of the right internal carotid artery.  Grayscale imaging shows intimal thickening and calcified elements at the carotid bifurcation. The left internal carotid artery peak systolic velocity is 80.9 cm/sec. The end-diastolic velocity is 33 cm/sec. The left ICA/CCA ratio is approximately 0.8 . These findings correlate with less than 50% stenosis of the left internal carotid artery.  Antegrade flow is demonstrated  in bilateral vertebral arteries.  This report was finalized on 03/03/2022 13:46 by Dr. Giovanny Payne MD.       Patient Active Problem List   Diagnosis   • Brainstem stroke (HCC)   • Mixed hyperlipidemia   • Cardiac arrhythmia   • Hypertension   • Atypical facial pain         ICD-10-CM ICD-9-CM   1. Bilateral carotid artery stenosis  I65.23 433.10     433.30   2. Mixed hyperlipidemia  E78.2 272.2   3. Essential hypertension  I10 401.9       Plan: After thoroughly evaluating Candida Powell, I believe the best course of action is to remain conservative from vascular surgery standpoint.  Currently she is doing well and denies any strokelike symptoms.  I did review her testing which shows less than 50% carotid stenosis bilaterally.  We will see her back in 1 year with repeat noninvasive testing for continued surveillance, including a carotid duplex.  I did discuss vascular risk factors as they pertain to the progression of vascular disease including controlling her hypertension and hyperlipidemia.  Her blood pressure is elevated in office today at 144/104.  She is asymptomatic regarding.  She is maintained on fish oil and Crestor for her hyperlipidemia.  Patient's Body mass index is 26.78 kg/m². BMI is above normal parameters. Recommendations include: educational material. The patient can continue taking their current medication regimen as previously planned.  This was all discussed in full with complete understanding.    Thank you for allowing me to participate in the care of your patient.  Please do not hesitate with any questions or concerns.  I will keep you aware of any further encounters with Candida Powell.        Sincerely yours,         SAMMY Mondragon

## 2022-03-23 ENCOUNTER — TELEPHONE (OUTPATIENT)
Dept: VASCULAR SURGERY | Facility: CLINIC | Age: 62
End: 2022-03-23

## 2022-03-24 ENCOUNTER — OFFICE VISIT (OUTPATIENT)
Dept: VASCULAR SURGERY | Facility: CLINIC | Age: 62
End: 2022-03-24

## 2022-03-24 VITALS
SYSTOLIC BLOOD PRESSURE: 134 MMHG | OXYGEN SATURATION: 98 % | HEIGHT: 64 IN | DIASTOLIC BLOOD PRESSURE: 86 MMHG | RESPIRATION RATE: 18 BRPM | HEART RATE: 86 BPM | BODY MASS INDEX: 26.63 KG/M2 | WEIGHT: 156 LBS

## 2022-03-24 DIAGNOSIS — I10 ESSENTIAL HYPERTENSION: ICD-10-CM

## 2022-03-24 DIAGNOSIS — E78.2 MIXED HYPERLIPIDEMIA: ICD-10-CM

## 2022-03-24 DIAGNOSIS — I65.23 BILATERAL CAROTID ARTERY STENOSIS: Primary | ICD-10-CM

## 2022-03-24 PROCEDURE — 99214 OFFICE O/P EST MOD 30 MIN: CPT | Performed by: SURGERY

## 2022-03-24 RX ORDER — MULTIPLE VITAMINS W/ MINERALS TAB 9MG-400MCG
1 TAB ORAL DAILY
COMMUNITY

## 2022-08-08 ENCOUNTER — APPOINTMENT (OUTPATIENT)
Dept: GENERAL RADIOLOGY | Facility: HOSPITAL | Age: 62
End: 2022-08-08

## 2022-08-08 ENCOUNTER — HOSPITAL ENCOUNTER (EMERGENCY)
Facility: HOSPITAL | Age: 62
Discharge: HOME | End: 2022-08-08
Attending: EMERGENCY MEDICINE | Admitting: EMERGENCY MEDICINE

## 2022-08-08 VITALS
WEIGHT: 155 LBS | HEART RATE: 76 BPM | OXYGEN SATURATION: 98 % | DIASTOLIC BLOOD PRESSURE: 76 MMHG | BODY MASS INDEX: 26.46 KG/M2 | HEIGHT: 64 IN | TEMPERATURE: 98.2 F | SYSTOLIC BLOOD PRESSURE: 112 MMHG | RESPIRATION RATE: 18 BRPM

## 2022-08-08 DIAGNOSIS — I47.1 SVT (SUPRAVENTRICULAR TACHYCARDIA): Primary | ICD-10-CM

## 2022-08-08 DIAGNOSIS — E55.9 VITAMIN D DEFICIENCY, UNSPECIFIED: ICD-10-CM

## 2022-08-08 LAB
ALBUMIN SERPL-MCNC: 4.4 G/DL (ref 3.5–5.2)
ALBUMIN/GLOB SERPL: 1.6 G/DL
ALP SERPL-CCNC: 86 U/L (ref 39–117)
ALT SERPL W P-5'-P-CCNC: 16 U/L (ref 1–33)
ANION GAP SERPL CALCULATED.3IONS-SCNC: 11 MMOL/L (ref 5–15)
AST SERPL-CCNC: 16 U/L (ref 1–32)
BASOPHILS # BLD AUTO: 0.02 10*3/MM3 (ref 0–0.2)
BASOPHILS NFR BLD AUTO: 0.3 % (ref 0–1.5)
BILIRUB SERPL-MCNC: 0.4 MG/DL (ref 0–1.2)
BUN SERPL-MCNC: 16 MG/DL (ref 8–23)
BUN/CREAT SERPL: 19.3 (ref 7–25)
CALCIUM SPEC-SCNC: 8.7 MG/DL (ref 8.6–10.5)
CARBAMAZEPINE SERPL-MCNC: 4.5 MCG/ML (ref 4–12)
CHLORIDE SERPL-SCNC: 104 MMOL/L (ref 98–107)
CO2 SERPL-SCNC: 25 MMOL/L (ref 22–29)
CREAT SERPL-MCNC: 0.83 MG/DL (ref 0.57–1)
D DIMER PPP FEU-MCNC: 0.31 MCGFEU/ML (ref 0–0.5)
DEPRECATED RDW RBC AUTO: 43.8 FL (ref 37–54)
EGFRCR SERPLBLD CKD-EPI 2021: 78.8 ML/MIN/1.73
EOSINOPHIL # BLD AUTO: 0.07 10*3/MM3 (ref 0–0.4)
EOSINOPHIL NFR BLD AUTO: 1 % (ref 0.3–6.2)
ERYTHROCYTE [DISTWIDTH] IN BLOOD BY AUTOMATED COUNT: 13.4 % (ref 12.3–15.4)
GLOBULIN UR ELPH-MCNC: 2.8 GM/DL
GLUCOSE SERPL-MCNC: 138 MG/DL (ref 65–99)
HCT VFR BLD AUTO: 44.1 % (ref 34–46.6)
HGB BLD-MCNC: 14.6 G/DL (ref 12–15.9)
HOLD SPECIMEN: NORMAL
HOLD SPECIMEN: NORMAL
IMM GRANULOCYTES # BLD AUTO: 0.02 10*3/MM3 (ref 0–0.05)
IMM GRANULOCYTES NFR BLD AUTO: 0.3 % (ref 0–0.5)
LYMPHOCYTES # BLD AUTO: 1.73 10*3/MM3 (ref 0.7–3.1)
LYMPHOCYTES NFR BLD AUTO: 23.7 % (ref 19.6–45.3)
MCH RBC QN AUTO: 29.6 PG (ref 26.6–33)
MCHC RBC AUTO-ENTMCNC: 33.1 G/DL (ref 31.5–35.7)
MCV RBC AUTO: 89.3 FL (ref 79–97)
MONOCYTES # BLD AUTO: 0.41 10*3/MM3 (ref 0.1–0.9)
MONOCYTES NFR BLD AUTO: 5.6 % (ref 5–12)
NEUTROPHILS NFR BLD AUTO: 5.06 10*3/MM3 (ref 1.7–7)
NEUTROPHILS NFR BLD AUTO: 69.1 % (ref 42.7–76)
NRBC BLD AUTO-RTO: 0 /100 WBC (ref 0–0.2)
PLATELET # BLD AUTO: 325 10*3/MM3 (ref 140–450)
PMV BLD AUTO: 9 FL (ref 6–12)
POTASSIUM SERPL-SCNC: 3.9 MMOL/L (ref 3.5–5.2)
PROT SERPL-MCNC: 7.2 G/DL (ref 6–8.5)
RBC # BLD AUTO: 4.94 10*6/MM3 (ref 3.77–5.28)
SODIUM SERPL-SCNC: 140 MMOL/L (ref 136–145)
TROPONIN T SERPL-MCNC: <0.01 NG/ML (ref 0–0.03)
TROPONIN T SERPL-MCNC: <0.01 NG/ML (ref 0–0.03)
WBC NRBC COR # BLD: 7.31 10*3/MM3 (ref 3.4–10.8)
WHOLE BLOOD HOLD COAG: NORMAL
WHOLE BLOOD HOLD SPECIMEN: NORMAL

## 2022-08-08 PROCEDURE — 85025 COMPLETE CBC W/AUTO DIFF WBC: CPT | Performed by: EMERGENCY MEDICINE

## 2022-08-08 PROCEDURE — 36415 COLL VENOUS BLD VENIPUNCTURE: CPT

## 2022-08-08 PROCEDURE — 99284 EMERGENCY DEPT VISIT MOD MDM: CPT

## 2022-08-08 PROCEDURE — 25010000002 ADENOSINE PER 6 MG: Performed by: EMERGENCY MEDICINE

## 2022-08-08 PROCEDURE — 96374 THER/PROPH/DIAG INJ IV PUSH: CPT

## 2022-08-08 PROCEDURE — 71045 X-RAY EXAM CHEST 1 VIEW: CPT

## 2022-08-08 PROCEDURE — 80156 ASSAY CARBAMAZEPINE TOTAL: CPT | Performed by: EMERGENCY MEDICINE

## 2022-08-08 PROCEDURE — 93010 ELECTROCARDIOGRAM REPORT: CPT | Performed by: INTERNAL MEDICINE

## 2022-08-08 PROCEDURE — 80053 COMPREHEN METABOLIC PANEL: CPT | Performed by: EMERGENCY MEDICINE

## 2022-08-08 PROCEDURE — 85379 FIBRIN DEGRADATION QUANT: CPT | Performed by: EMERGENCY MEDICINE

## 2022-08-08 PROCEDURE — 84484 ASSAY OF TROPONIN QUANT: CPT | Performed by: EMERGENCY MEDICINE

## 2022-08-08 PROCEDURE — 93005 ELECTROCARDIOGRAM TRACING: CPT | Performed by: EMERGENCY MEDICINE

## 2022-08-08 RX ORDER — ASPIRIN 81 MG/1
324 TABLET, CHEWABLE ORAL ONCE
Status: COMPLETED | OUTPATIENT
Start: 2022-08-08 | End: 2022-08-08

## 2022-08-08 RX ORDER — METOPROLOL TARTRATE 5 MG/5ML
5 INJECTION INTRAVENOUS ONCE
Status: DISCONTINUED | OUTPATIENT
Start: 2022-08-08 | End: 2022-08-08 | Stop reason: HOSPADM

## 2022-08-08 RX ORDER — SODIUM CHLORIDE 0.9 % (FLUSH) 0.9 %
10 SYRINGE (ML) INJECTION AS NEEDED
Status: DISCONTINUED | OUTPATIENT
Start: 2022-08-08 | End: 2022-08-08 | Stop reason: HOSPADM

## 2022-08-08 RX ORDER — ADENOSINE 3 MG/ML
6 INJECTION, SOLUTION INTRAVENOUS ONCE
Status: COMPLETED | OUTPATIENT
Start: 2022-08-08 | End: 2022-08-08

## 2022-08-08 RX ADMIN — ADENOSINE 6 MG: 3 INJECTION INTRAVENOUS at 10:10

## 2022-08-08 RX ADMIN — ASPIRIN 243 MG: 81 TABLET, CHEWABLE ORAL at 10:13

## 2022-08-08 NOTE — ED PROVIDER NOTES
Subjective   Patient is 62-year-old is got history SVT came the ER for fast heart rate.      Palpitations  Palpitations quality:  Irregular  Onset quality:  Sudden  Timing:  Constant  Progression:  Worsening  Chronicity:  New  Context: not anxiety, not appetite suppressants, not blood loss, not bronchodilators, not dehydration, not exercise, not illicit drugs, not nicotine and not stimulant use    Relieved by:  Nothing  Worsened by:  Nothing  Ineffective treatments:  None tried  Associated symptoms: chest pain and dizziness    Associated symptoms: no back pain, no chest pressure, no cough, no diaphoresis, no lower extremity edema, no malaise/fatigue, no nausea, no near-syncope, no numbness, no PND, no shortness of breath, no syncope, no vomiting and no weakness    Risk factors: no diabetes mellitus, no heart disease, no hx of DVT, no hx of PE, no hx of thyroid disease, no hyperthyroidism and no stress        Review of Systems   Constitutional: Negative.  Negative for activity change, appetite change, chills, diaphoresis, fatigue, fever and malaise/fatigue.   HENT: Negative for congestion, drooling, ear pain, facial swelling, hearing loss, sinus pressure and sore throat.    Eyes: Negative.  Negative for discharge.   Respiratory: Negative for cough and shortness of breath.    Cardiovascular: Positive for chest pain and palpitations. Negative for syncope, PND and near-syncope.   Gastrointestinal: Negative for abdominal distention, abdominal pain, blood in stool, diarrhea, nausea and vomiting.   Endocrine: Negative.  Negative for cold intolerance, heat intolerance, polydipsia, polyphagia and polyuria.   Genitourinary: Negative.  Negative for dysuria, flank pain and urgency.   Musculoskeletal: Negative.  Negative for arthralgias, back pain, myalgias and neck stiffness.   Skin: Negative.  Negative for color change, pallor and rash.   Allergic/Immunologic: Negative.    Neurological: Positive for dizziness. Negative for  seizures, speech difficulty, weakness, numbness and headaches.   Hematological: Negative.  Negative for adenopathy.   All other systems reviewed and are negative.      Past Medical History:   Diagnosis Date   • Hypertension    • Stroke (HCC)     April 2018   • SVT (supraventricular tachycardia) (HCC)        No Known Allergies    Past Surgical History:   Procedure Laterality Date   • NO PAST SURGERIES         Family History   Problem Relation Age of Onset   • No Known Problems Mother    • No Known Problems Father        Social History     Socioeconomic History   • Marital status:    Tobacco Use   • Smoking status: Never Smoker   • Smokeless tobacco: Never Used   Substance and Sexual Activity   • Alcohol use: No   • Drug use: No   • Sexual activity: Defer           Objective   Physical Exam  Vitals and nursing note reviewed. Exam conducted with a chaperone present.   Constitutional:       General: She is not in acute distress.     Appearance: Normal appearance. She is well-developed. She is not toxic-appearing or diaphoretic.   HENT:      Head: Normocephalic and atraumatic.      Right Ear: External ear normal.      Nose: Nose normal.      Mouth/Throat:      Mouth: Mucous membranes are moist.   Eyes:      Conjunctiva/sclera: Conjunctivae normal.      Pupils: Pupils are equal, round, and reactive to light.   Cardiovascular:      Rate and Rhythm: Regular rhythm. Tachycardia present.      Chest Wall: PMI is not displaced.      Pulses: Normal pulses. No decreased pulses.      Heart sounds: Normal heart sounds. No murmur heard.  Pulmonary:      Effort: Pulmonary effort is normal. No tachypnea, accessory muscle usage or respiratory distress.      Breath sounds: Normal breath sounds. No stridor. No decreased breath sounds, wheezing, rhonchi or rales.   Chest:      Chest wall: No tenderness or crepitus.   Abdominal:      General: Bowel sounds are normal. There is no distension.      Palpations: Abdomen is soft.       Tenderness: There is no abdominal tenderness.   Musculoskeletal:         General: No swelling or tenderness. Normal range of motion.      Cervical back: Normal range of motion and neck supple. No rigidity.      Comments: Lower extremity exam bilaterally is unremarkable.  There is no right or left calf tenderness .  There is no palpable venous cord.  No obvious difference in the size of the legs.  No pitting edema.  The dorsalis pedis and posterior tibial femoral and popliteal pulses are palpable and +2 bilaterally.  Homans sign is negative   Skin:     General: Skin is warm.      Capillary Refill: Capillary refill takes less than 2 seconds.      Coloration: Skin is not jaundiced.      Findings: No erythema or rash.   Neurological:      General: No focal deficit present.      Mental Status: She is alert and oriented to person, place, and time. Mental status is at baseline.      Cranial Nerves: No cranial nerve deficit.      Motor: No weakness.      Coordination: Coordination normal.      Deep Tendon Reflexes: Reflexes are normal and symmetric. Reflexes normal.   Psychiatric:         Mood and Affect: Mood normal.         Chemical Cardioversion    Date/Time: 8/8/2022 10:03 AM  Performed by: Omi Pride MD  Authorized by: Omi Pride MD     Consent:     Consent obtained:  Verbal    Consent given by:  Patient    Risks discussed:  Death, induced arrhythmia and pain    Alternatives discussed:  Electrical cardioversion  Pre-procedure details:     Cardioversion basis:  Emergent    Rhythm:  Supraventricular tachycardia  Attempt one:     Cardioversion medication:  Adenosine 6mg      Medication outcome:  Conversion to normal sinus rhythm  Post-procedure details:     Patient status:  Awake    Patient tolerance of procedure:  Tolerated well, no immediate complications               ED Course  ED Course as of 08/08/22 1416   Mon Aug 08, 2022   1050 SVT [TS]   1050 Normal sinus rhythm [TS]   1223 Normal sinus rhythm [TS]    1332 Years Algorithm for Pulmonary Embolus  To be used in hemodynamically stable patient >18 years old    No signs of DVT are present, there is no hemoptysis, PE is not the most likely diagnosis and the D-dimer is not greater or equal to 1000 ng/mL. Therefore PE is excluded . The Years algorithm rules out PE (0.43 % with symptomatic VTE during 3-month follow-up) [TS]   1413 Patient is doing much better normal sinus rhythm no acute distress.  I discussed this case at length with the patient and she has seen EP before she probably needs to have a follow-up with them.  We will discharge her home with a follow-up with the primary MD and return the ER for any worsening symptoms. [TS]   1413 Risks and benefits of treatments given and alternative treatment options discussed with patient/family. I answered all the questions in simple, plain language, and there was voiced understanding and agreement with plan of care. There were no further questions. Differential diagnosis discussed. Patient/family was advised that the practice of medicine is not always an exact science, and sometimes tests, physical exam, or history may not show the underlying conditions with certainty. Additionally, the condition may change or show itself later after initial presentation. There was also expressed understanding and agreement with this limitation of emergency medicine practice. Patient/family was asked to return to ED if any problem or issues or if condition worsens or does not improved. Patient/family agreed to follow up with PCP/specialist as advised, or return to ED if unable to see a provider in a timely fashion for continued symptoms.  [TS]      ED Course User Index  [TS] Omi Pride MD                                           MDM  Number of Diagnoses or Management Options  Diagnosis management comments: Tachycardia       Amount and/or Complexity of Data Reviewed  Clinical lab tests: reviewed and ordered  Tests in the radiology  section of CPT®: ordered and reviewed  Tests in the medicine section of CPT®: ordered and reviewed    Risk of Complications, Morbidity, and/or Mortality  Presenting problems: moderate  Diagnostic procedures: moderate  Management options: moderate        Final diagnoses:   SVT (supraventricular tachycardia) (HCC)       ED Disposition  ED Disposition     ED Disposition   Discharge    Condition   Stable    Comment   --             Fidel Hoskins MD  [address]  [place] [address]  [phone]               Medication List      No changes were made to your prescriptions during this visit.          Omi Pride MD  08/08/22 1216       Omi Pride MD  08/08/22 1416       Omi Pride MD  08/08/22 1416

## 2022-08-09 LAB
QT INTERVAL: 244 MS
QT INTERVAL: 394 MS
QTC INTERVAL: 425 MS
QTC INTERVAL: 451 MS

## 2022-08-09 RX ORDER — ERGOCALCIFEROL 1.25 MG/1
CAPSULE ORAL
Qty: 4 CAPSULE | Refills: 5 | Status: SHIPPED | OUTPATIENT
Start: 2022-08-09 | End: 2023-01-23 | Stop reason: SDUPTHER

## 2023-01-23 DIAGNOSIS — E55.9 VITAMIN D DEFICIENCY, UNSPECIFIED: ICD-10-CM

## 2023-01-23 RX ORDER — ERGOCALCIFEROL 1.25 MG/1
CAPSULE ORAL
Qty: 4 CAPSULE | Refills: 5 | Status: SHIPPED | OUTPATIENT
Start: 2023-01-23

## 2023-03-08 ENCOUNTER — TELEPHONE (OUTPATIENT)
Dept: VASCULAR SURGERY | Facility: CLINIC | Age: 63
End: 2023-03-08
Payer: COMMERCIAL

## 2023-03-08 NOTE — TELEPHONE ENCOUNTER
Called the patient to remind her of her appt tomorrow for testing and to see Myrna.  Pt confirmed.

## 2023-03-09 ENCOUNTER — HOSPITAL ENCOUNTER (OUTPATIENT)
Dept: ULTRASOUND IMAGING | Facility: HOSPITAL | Age: 63
Discharge: HOME | End: 2023-03-09
Admitting: NURSE PRACTITIONER
Payer: COMMERCIAL

## 2023-03-09 ENCOUNTER — OFFICE VISIT (OUTPATIENT)
Dept: VASCULAR SURGERY | Facility: CLINIC | Age: 63
End: 2023-03-09
Payer: COMMERCIAL

## 2023-03-09 VITALS
DIASTOLIC BLOOD PRESSURE: 84 MMHG | OXYGEN SATURATION: 96 % | HEIGHT: 64 IN | WEIGHT: 151 LBS | SYSTOLIC BLOOD PRESSURE: 134 MMHG | BODY MASS INDEX: 25.78 KG/M2 | HEART RATE: 82 BPM

## 2023-03-09 DIAGNOSIS — I10 ESSENTIAL HYPERTENSION: ICD-10-CM

## 2023-03-09 DIAGNOSIS — E78.2 MIXED HYPERLIPIDEMIA: ICD-10-CM

## 2023-03-09 DIAGNOSIS — I65.23 BILATERAL CAROTID ARTERY STENOSIS: ICD-10-CM

## 2023-03-09 DIAGNOSIS — I65.23 BILATERAL CAROTID ARTERY STENOSIS: Primary | ICD-10-CM

## 2023-03-09 PROCEDURE — 93880 EXTRACRANIAL BILAT STUDY: CPT

## 2023-03-09 PROCEDURE — 99214 OFFICE O/P EST MOD 30 MIN: CPT | Performed by: NURSE PRACTITIONER

## 2023-03-09 PROCEDURE — 93880 EXTRACRANIAL BILAT STUDY: CPT | Performed by: SURGERY

## 2023-03-09 NOTE — PROGRESS NOTES
"3/9/2023        Fidel Hoskins MD  [address]  [place] [address]      Candida Powell  1960    Chief Complaint   Patient presents with   • Follow-up     1 yr f/u with carotid testing.  Last seen in the office on 3/24/22.  Pt denies any stroke type symptoms.        Dear Fidel Hoskins MD        HPI  I had the pleasure of seeing your patient Candida Powell in the office today.    As you recall, Candida Powell is a 62 y.o.  female who you are following for routine health maintenance.  Currently she is doing well and denies any strokelike symptoms.  She is maintained on aspirin, fish oil, and Crestor.  She did have noninvasive testing performed today, which I did review in office.       Review of Systems   Constitutional: Negative.    HENT: Negative.    Eyes: Negative.    Respiratory: Negative.    Cardiovascular: Negative.    Gastrointestinal: Negative.    Endocrine: Negative.    Genitourinary: Negative.    Musculoskeletal: Negative.    Skin: Negative.    Allergic/Immunologic: Negative.    Neurological: Negative.    Hematological: Negative.    Psychiatric/Behavioral: Negative.    All other systems reviewed and are negative.       /84   Pulse 82   Ht 162.6 cm (64\")   Wt 68.5 kg (151 lb)   SpO2 96%   BMI 25.92 kg/m²   Physical Exam  Vitals and nursing note reviewed.   Constitutional:       General: She is not in acute distress.     Appearance: Normal appearance. She is well-developed. She is not diaphoretic.   HENT:      Head: Normocephalic and atraumatic.   Eyes:      General: No scleral icterus.     Pupils: Pupils are equal, round, and reactive to light.   Neck:      Thyroid: No thyromegaly.      Vascular: No carotid bruit or JVD.   Cardiovascular:      Rate and Rhythm: Normal rate and regular rhythm.      Pulses: Normal pulses.      Heart sounds: Normal heart sounds and S2 normal. No murmur heard.    No friction rub. No gallop.   Pulmonary:      Effort: Pulmonary effort is " normal.      Breath sounds: Normal breath sounds.   Abdominal:      General: Bowel sounds are normal.      Palpations: Abdomen is soft.   Musculoskeletal:         General: Normal range of motion.      Cervical back: Normal range of motion and neck supple.   Skin:     General: Skin is warm and dry.   Neurological:      General: No focal deficit present.      Mental Status: She is alert and oriented to person, place, and time.      Cranial Nerves: No cranial nerve deficit.   Psychiatric:         Mood and Affect: Mood normal.         Behavior: Behavior normal.         Thought Content: Thought content normal.         Judgment: Judgment normal.             Diagnostic data:  Noninvasive test including a carotid duplex shows less than 50% carotid stenosis bilaterally with bilateral antegrade vertebral flow.    Patient Active Problem List   Diagnosis   • Brainstem stroke (HCC)   • Mixed hyperlipidemia   • Cardiac arrhythmia   • Hypertension   • Atypical facial pain         ICD-10-CM ICD-9-CM   1. Bilateral carotid artery stenosis  I65.23 433.10     433.30   2. Mixed hyperlipidemia  E78.2 272.2   3. Essential hypertension  I10 401.9       Plan: After thoroughly evaluating Candida Powell, I believe the best course of action is to remain conservative from vascular surgery standpoint.  Currently she is doing well and denies any strokelike symptoms.  I did review her testing which shows less than 50% carotid stenosis bilaterally.  We will see her back in 1 year with repeat noninvasive testing for continued surveillance, including a carotid duplex. I did discuss vascular risk factors as they pertain to the progression of vascular disease including controlling her hyperlipidemia and hypertension.  Her blood pressure is currently stable.  She is maintained on Crestor and fish oil for hyperlipidemia.  The patient can continue taking their current medication regimen as previously planned.  This was all discussed in full with  complete understanding.    Thank you for allowing me to participate in the care of your patient.  Please do not hesitate with any questions or concerns.  I will keep you aware of any further encounters with Candida Powell.        Sincerely yours,         SAMMY Mondragon

## 2023-09-12 ENCOUNTER — TRANSCRIBE ORDERS (OUTPATIENT)
Dept: ADMINISTRATIVE | Facility: HOSPITAL | Age: 63
End: 2023-09-12
Payer: COMMERCIAL

## 2023-09-12 DIAGNOSIS — E04.1 THYROID NODULE: Primary | ICD-10-CM

## 2023-09-18 ENCOUNTER — HOSPITAL ENCOUNTER (OUTPATIENT)
Dept: ULTRASOUND IMAGING | Facility: HOSPITAL | Age: 63
Discharge: HOME | End: 2023-09-18
Admitting: OBSTETRICS & GYNECOLOGY
Payer: COMMERCIAL

## 2023-09-18 DIAGNOSIS — E04.1 THYROID NODULE: ICD-10-CM

## 2023-09-18 PROCEDURE — 76536 US EXAM OF HEAD AND NECK: CPT

## 2023-09-26 NOTE — PROGRESS NOTES
AUDIOMETRIC EVALUATION      Name:  Candida Powell  :  1960  Age:  63 y.o.  Date of Evaluation:  2023       History:  Ms. Powell is seen today for a hearing evaluation due to sudden hearing loss at the request of SAMMY Smith.    Audiologic Information:  Concerns for Hearing: ***  PETs: ***  Other otologic surgical history: ***  Aural Pressure/Fullness: ***  Otalgia: ***  Otorrhea: ***  Tinnitus: ***  Dizziness: ***  Noise Exposure: ***  Family history of hearing loss: ***  Head trauma requiring hospital stay: ***  Chemotherapy: ***  Other significant history: ***    **Case history obtained in office and through EMR system      EVALUATION:        RESULTS:    Otoscopic Evaluation:  Right: {khstympanicmembrane:03583}  Left: {khstympanicmembrane:79394}  **Completed through ENT provider prior to testing    Tympanometry (226 Hz):  Right: {ALBTymps:97374}  Left: {ALBTymps:93754}    Acoustic Reflexes (Ipsilateral):  {khsacousticreflexeval:84508}    Otoacoustic Emissions ({KHSOAEFREQ:70713}):  {khsOAESev:39933}    Pure Tone Audiometry:    Right: ***  Left: ***    Speech Audiometry:   Right: {ALB SRT/SAT:15375}  Word Recognition scores - {ALBWRS:43165}% at *** dB {CREWRSMaskin}, using {KHSwordrec:29704} with {khswordcount:64386}  Left: {ALB SRT/SAT:32294}  Word Recognition scores - {ALBWRS:65277}% at *** dB {CREWRSMaskin}, using {KHSwordrec:62327} with {khswordcount:50406}  SRT/PTA in {SRT/PTA:23289} agreement {CREears:49191}.    IMPRESSIONS:  {CREtympanometry:30361} {KHSDPOAEs:48048} Pure tone thresholds for {Ears AD/AS/AU:18817} show ***, suggesting {WWEnlabnl:59270} outer/middle ear function and {WWEnlabnl:40651} cochlear/retrocochlear function. Pure tone thresholds for {Ears AD/AS/AU:07350} show ***, suggesting {WWEnlabnl:92302} outer/middle ear function and {WWEnlabnl:27734} cochlear/retrocochlear function. Patient was counseled with regard to the findings.    Amplification needs:   {Amplification needs:17023}    Diagnosis:  No diagnosis found.     RECOMMENDATIONS/PLAN:  Follow-up recommendations per Sergo Dillard, APRN.  {CRERecommendations:05254}  Discussed results and recommendations with patient. Questions were addressed and the patient was encouraged to contact our department should concerns arise.        Kajal Manriquez, CCC-A  Doctor of Audiology

## 2023-09-26 NOTE — PROGRESS NOTES
YOB: 1960  Location: [facility]  Location Address: [address] [place] [address], [facility], [address] [place] [address]  Location Phone: [phone]    Chief Complaint   Patient presents with    Hearing Loss    Thyroid Problem       History of Present Illness  Candida Powell is a 63 y.o. female.  Candida Powell is here for evaluation of ENT complaints. The patient has had problems with decreased hearing. This began on 23 when she had an upper respiratory infection. She was treated with a medrol pack, an antibiotic, and astelin and flonase.  The symptoms are localized to both ears. The patient has had moderate to severe symptoms. The symptoms have been present for the last 2-3 weeks.    She also has a history of thyroid nodules. She recently had an ultrasound of her neck that revealed stable thyroid nodules and a reactive lymph node.    SCANNED - IMAGING (2023)    Procedure visit with Mariama Saleem AUD (2023)     Past Medical History:   Diagnosis Date    HL (hearing loss) 2023    Hypertension     Stroke     2018    SVT (supraventricular tachycardia)        Past Surgical History:   Procedure Laterality Date    NO PAST SURGERIES         Outpatient Medications Marked as Taking for the 23 encounter (Office Visit) with Sergo Dillard APRN   Medication Sig Dispense Refill    aspirin 81 MG tablet Take 1 tablet by mouth Daily.      azelastine (ASTELIN) 0.1 % nasal spray Instill 2 sprays into the nostril(s) as directed by provider 2 (Two) Times a Day 30 mL 0    carBAMazepine XR (TEGretol  XR) 100 MG 12 hr tablet Take 1 tablet by mouth Every 12 (Twelve) Hours. 60 tablet 5    cefdinir (OMNICEF) 300 MG capsule Take 1 capsule by mouth Every 12 (Twelve) Hours for 7 days. 14 capsule 0    fluticasone (Flonase Allergy Relief) 50 MCG/ACT nasal spray Instill 1 spray into the nostril(s) as directed by provider Daily. 16 g 0    Krill Oil Omega-3 500 MG capsule Take  by mouth Daily.       rosuvastatin (CRESTOR) 5 MG tablet Take 1 tablet by mouth every day at bedtime. 90 tablet 3    verapamil SR (CALAN-SR) 240 MG CR tablet Take 1 tablet by mouth Daily. 90 tablet 3    vitamin D (ERGOCALCIFEROL) 1.25 MG (05797 UT) capsule capsule Take 1 capsule by mouth once weekly. 4 capsule 5       Patient has no known allergies.    Family History   Problem Relation Age of Onset    Stroke Mother     Hypertension Father        Social History     Socioeconomic History    Marital status:    Tobacco Use    Smoking status: Never    Smokeless tobacco: Never   Vaping Use    Vaping Use: Never used   Substance and Sexual Activity    Alcohol use: No    Drug use: No    Sexual activity: Yes     Partners: Male       Review of Systems   Constitutional: Negative.    HENT:  Positive for ear pain and hearing loss.    Respiratory: Negative.     Hematological:  Positive for adenopathy.     Vitals:       Body mass index is 24.03 kg/m².    Objective     Physical Exam  Vitals reviewed.   Constitutional:       Appearance: Normal appearance. She is normal weight.   HENT:      Head: Normocephalic.      Right Ear: Ear canal and external ear normal. Decreased hearing noted. A middle ear effusion is present. Tympanic membrane is erythematous.      Left Ear: Ear canal and external ear normal. Decreased hearing noted. A middle ear effusion is present. Tympanic membrane is erythematous.      Nose: Nose normal.      Mouth/Throat:      Lips: Pink.      Mouth: Mucous membranes are moist.      Pharynx: Oropharynx is clear.   Musculoskeletal:      Cervical back: Full passive range of motion without pain.   Lymphadenopathy:      Cervical: Cervical adenopathy present.   Neurological:      Mental Status: She is alert.   Psychiatric:         Behavior: Behavior is cooperative.       Assessment & Plan   Diagnoses and all orders for this visit:    1. ETD (Eustachian tube dysfunction), bilateral (Primary)    2. Recurrent acute serous otitis media of both  ears  -     Comprehensive Hearing Test    3. Decreased hearing of both ears  -     Comprehensive Hearing Test    4. Thyroid nodule  -     US Head Neck Soft Tissue; Future    5. Lymphadenopathy  -     US Head Neck Soft Tissue; Future    6. Mixed conductive and sensorineural hearing loss of both ears      * Surgery not found *  Orders Placed This Encounter   Procedures    US Head Neck Soft Tissue     Standing Status:   Future     Standing Expiration Date:   9/27/2024     Order Specific Question:   Reason for Exam:     Answer:   lymphadenopathy     Order Specific Question:   Release to patient     Answer:   Routine Release [[id number]]    Comprehensive Hearing Test     Order Specific Question:   Laterality     Answer:   Bilateral     Order Specific Question:   Release to patient     Answer:   Routine Release [[id number]]     Discussed in office PE tube placement  Will obtain hearing test and ultrasound at follow up  Return for problems    MYRINGOTOMY TUBE INSERTION: The risks and benefits of myringotomy tube insertion were explained including but not limited to pain, aural fullness, bleeding, infection, risks of the anesthesia, persistent tympanic membrane perforation, chronic otorrhea, early and late extrusion, and the possibility for the need of reinsertion after extrusion. Alternatives were discussed. The patient/parents demonstrated understanding of these risks. Questions were asked appropriately answered.         Return for Next scheduled follow up for in office procedure with Dr. Sanchez.       Patient Instructions   Discussed in office PE tube placement  Will obtain hearing test and ultrasound at follow up  Return for problems    MYRINGOTOMY TUBE INSERTION: The risks and benefits of myringotomy tube insertion were explained including but not limited to pain, aural fullness, bleeding, infection, risks of the anesthesia, persistent tympanic membrane perforation, chronic otorrhea, early and late extrusion, and the  possibility for the need of reinsertion after extrusion. Alternatives were discussed. The patient/parents demonstrated understanding of these risks. Questions were asked appropriately answered.       CONTACT INFORMATION:  The main office phone number is [phone]. For emergencies after hours and on weekends, this number will convert over to our answering service and the on call provider will answer. Please try to keep non emergent phone calls/ questions to office hours 9am-5pm Monday through Friday.      [facility]  As an alternative, you can sign up and use the Epic MyChart system for more direct and quicker access for non emergent questions/ problems.  [employer] allows you to send messages to your doctor, view your test results, renew your prescriptions, schedule appointments, and more. To sign up, go to [employer] and click on the Sign Up Now link in the New User? box. Enter your [facility] Activation Code exactly as it appears below along with the last four digits of your Social Security Number and your Date of Birth () to complete the sign-up process. If you do not sign up before the expiration date, you must request a new code.     [facility] Activation Code: Activation code not generated  Current [facility]epoint Status: Active     If you have questions, you can email [email or web address] or call [phone] to talk to our [employer] staff. Remember, [employer] is NOT to be used for urgent needs. For medical emergencies, dial 911.     IF YOU SMOKE OR USE TOBACCO PLEASE READ THE FOLLOWING:  Why is smoking bad for me?  Smoking increases the risk of heart disease, lung disease, vascular disease, stroke, and cancer. If you smoke, STOP!        IF YOU SMOKE OR USE TOBACCO PLEASE READ THE FOLLOWING:  Why is smoking bad for me?  Smoking increases the risk of heart disease, lung disease, vascular disease, stroke, and cancer. If you smoke, STOP!     For more information:  Quit Now  [place]  1-800-QUIT-NOW  [email or web address]-[email or web address]

## 2023-09-27 ENCOUNTER — OFFICE VISIT (OUTPATIENT)
Dept: OTOLARYNGOLOGY | Facility: CLINIC | Age: 63
End: 2023-09-27
Payer: COMMERCIAL

## 2023-09-27 ENCOUNTER — PROCEDURE VISIT (OUTPATIENT)
Dept: OTOLARYNGOLOGY | Facility: CLINIC | Age: 63
End: 2023-09-27
Payer: COMMERCIAL

## 2023-09-27 VITALS — BODY MASS INDEX: 23.9 KG/M2 | WEIGHT: 140 LBS | HEIGHT: 64 IN

## 2023-09-27 DIAGNOSIS — H93.13 TINNITUS OF BOTH EARS: ICD-10-CM

## 2023-09-27 DIAGNOSIS — H69.93 ETD (EUSTACHIAN TUBE DYSFUNCTION), BILATERAL: ICD-10-CM

## 2023-09-27 DIAGNOSIS — H90.6 MIXED CONDUCTIVE AND SENSORINEURAL HEARING LOSS OF BOTH EARS: Primary | ICD-10-CM

## 2023-09-27 DIAGNOSIS — H65.06 RECURRENT ACUTE SEROUS OTITIS MEDIA OF BOTH EARS: ICD-10-CM

## 2023-09-27 DIAGNOSIS — H69.93 DYSFUNCTION OF BOTH EUSTACHIAN TUBES: ICD-10-CM

## 2023-09-27 DIAGNOSIS — E04.1 THYROID NODULE: ICD-10-CM

## 2023-09-27 DIAGNOSIS — H69.93 ETD (EUSTACHIAN TUBE DYSFUNCTION), BILATERAL: Primary | ICD-10-CM

## 2023-09-27 DIAGNOSIS — H90.6 MIXED CONDUCTIVE AND SENSORINEURAL HEARING LOSS OF BOTH EARS: ICD-10-CM

## 2023-09-27 DIAGNOSIS — H91.93 DECREASED HEARING OF BOTH EARS: ICD-10-CM

## 2023-09-27 DIAGNOSIS — R59.1 LYMPHADENOPATHY: ICD-10-CM

## 2023-09-27 NOTE — PATIENT INSTRUCTIONS
Discussed in office PE tube placement  Will obtain hearing test and ultrasound at follow up  Return for problems    MYRINGOTOMY TUBE INSERTION: The risks and benefits of myringotomy tube insertion were explained including but not limited to pain, aural fullness, bleeding, infection, risks of the anesthesia, persistent tympanic membrane perforation, chronic otorrhea, early and late extrusion, and the possibility for the need of reinsertion after extrusion. Alternatives were discussed. The patient/parents demonstrated understanding of these risks. Questions were asked appropriately answered.       CONTACT INFORMATION:  The main office phone number is [phone]. For emergencies after hours and on weekends, this number will convert over to our answering service and the on call provider will answer. Please try to keep non emergent phone calls/ questions to office hours 9am-5pm Monday through Friday.      [employer]  As an alternative, you can sign up and use the Epic MyChart system for more direct and quicker access for non emergent questions/ problems.  "[employer] allows you to send messages to your doctor, view your test results, renew your prescriptions, schedule appointments, and more. To sign up, go to [email or web address] and click on the Sign Up Now link in the New User? box. Enter your [employer] Activation Code exactly as it appears below along with the last four digits of your Social Security Number and your Date of Birth () to complete the sign-up process. If you do not sign up before the expiration date, you must request a new code.     [employer] Activation Code: Activation code not generated  Current [employer] Status: Active     If you have questions, you can email [email or web address] or call [phone] to talk to our [employer] staff. Remember, [employer] is NOT to be used for urgent needs. For medical emergencies, dial 911.     IF YOU SMOKE OR USE TOBACCO PLEASE READ THE FOLLOWING:  Why  is smoking bad for me?  Smoking increases the risk of heart disease, lung disease, vascular disease, stroke, and cancer. If you smoke, STOP!        IF YOU SMOKE OR USE TOBACCO PLEASE READ THE FOLLOWING:  Why is smoking bad for me?  Smoking increases the risk of heart disease, lung disease, vascular disease, stroke, and cancer. If you smoke, STOP!     For more information:  Quit Now [place]  1-800-QUIT-NOW  https://[place].quitlogix.org/en-[place]/

## 2023-09-27 NOTE — PROGRESS NOTES
AUDIOMETRIC EVALUATION      Name:  Candida Powell  :  1960  Age:  63 y.o.  Date of Evaluation:  2023       History:  Reason for visit:  Ms. Powell is seen today at the request of SAMMY Smith for a hearing evaluation. Patient was referred to ENT for sudden idiopathic hearing loss, left ear.  Patient reports she began having difficulty hearing out of both ears on 2023. She describes it as sounding muffled. She states she was seen by urgent care for throat and sinus issues at the time as well. She has not had a hearing test recently.     PE Tubes:  no, both ears   Other otologic surgical history: no, both ears  Tinnitus:  yes, constant humming in both ears   Dizziness:  no  Noise Exposure: no  Aural Fullness:  yes, both ears  Otalgia: no, both ears  Family history of hearing loss: yes, mother  Other significant history:  high blood pressure, stroke in 2018  Head trauma requiring hospital stay: no  Previous brain MRI: yes, [id number]      EVALUATION:        RESULTS:    Otoscopic Evaluation:  Right: clear canal, abnormal tympanic membrane visualized  Left: minimal cerumen on tympanic membrane, abnormal tympanic membrane visualized         NOTE: Testing completed after ears were examined by ENT provider    Tympanometry (226 Hz):  Bilateral: Type B- normal ear canal volume    Pure Tone Audiometry (via inserts with good reliability):    Right: mild sloping to moderately severe mixed hearing loss  Left: mild sloping to severe mixed hearing loss    Speech Audiometry:   Right: Speech Reception Threshold (SRT) was obtained at 45 dBHL  Word Recognition scores-  100% (excellent/within normal limits, %)   Presented at 85dBHL with 65dB of masking in opposite ear  Using NU-6 List 4A, 25 words  Left: Speech Reception Threshold (SRT) was obtained at 45 dBHL  Word Recognition scores-  92% (excellent/within normal limits, %)   Presented at 85dBHL with 65dB of masking in opposite ear  Using NU-6  List 4A, 25 words    IMPRESSIONS:  Tympanometry showed no measurable middle ear pressure or static compliance, consistent with middle ear pathology, for both ears. Pure tone thresholds for the right ear show a mild sloping to moderately severe mixed hearing loss and the pure tone thresholds for the left ear show a mild sloping to severe mixed hearing loss. Results suggest abnormal outer/middle ear function and abnormal cochlear/retrocochlear function, bilaterally. Patient was counseled with regard to the findings.    Diagnosis:  1. Mixed conductive and sensorineural hearing loss of both ears    2. Tinnitus of both ears    3. Dysfunction of both eustachian tubes         RECOMMENDATIONS/PLAN:  Follow-up recommendations per SAMMY Smith    Audiologic follow-up after medical intervention or in 3 months  Return for audiologic testing if you begin to changes in hearing or concerns arise  Use communication strategies such as looking at the speaker when they talk, have them get your attention before they speak, have them rephrase instead of repeat if you cannot understand them, limit background noise and be in the same room as the speaker  Avoid silence when possible. Sleep with white noise/fan, or listen to nature sounds     EDUCATION:  Discussed results and recommendations with patient. Questions were addressed and the patient was encouraged to contact our department should concerns arise.        Fabi Hernandez [facility]  Licensed Audiologist

## 2023-10-10 ENCOUNTER — PROCEDURE VISIT (OUTPATIENT)
Dept: OTOLARYNGOLOGY | Facility: CLINIC | Age: 63
End: 2023-10-10
Payer: COMMERCIAL

## 2023-10-10 VITALS
HEIGHT: 64 IN | BODY MASS INDEX: 24.07 KG/M2 | HEART RATE: 138 BPM | WEIGHT: 141 LBS | TEMPERATURE: 98 F | SYSTOLIC BLOOD PRESSURE: 135 MMHG | DIASTOLIC BLOOD PRESSURE: 81 MMHG

## 2023-10-10 DIAGNOSIS — H65.93 BILATERAL OTITIS MEDIA WITH EFFUSION: ICD-10-CM

## 2023-10-10 DIAGNOSIS — H65.06 RECURRENT ACUTE SEROUS OTITIS MEDIA OF BOTH EARS: ICD-10-CM

## 2023-10-10 DIAGNOSIS — H91.93 DECREASED HEARING OF BOTH EARS: ICD-10-CM

## 2023-10-10 DIAGNOSIS — H69.93 DYSFUNCTION OF BOTH EUSTACHIAN TUBES: ICD-10-CM

## 2023-10-10 DIAGNOSIS — H90.6 MIXED CONDUCTIVE AND SENSORINEURAL HEARING LOSS OF BOTH EARS: Primary | ICD-10-CM

## 2023-10-10 DIAGNOSIS — H69.93 ETD (EUSTACHIAN TUBE DYSFUNCTION), BILATERAL: ICD-10-CM

## 2023-10-10 DIAGNOSIS — H93.13 TINNITUS OF BOTH EARS: ICD-10-CM

## 2023-10-10 NOTE — PATIENT INSTRUCTIONS
WATER PRECAUTIONS FOR EARS    Protecting your ears from water may sometimes be necessary for the health of your ears.     > Ear plugs: You may use earplugs: over the counter silicone plugs or a cotton ball coated with vasoline when bathing. If conservative measures are not working, consider obtaining molded earplugs from the audiology department to use while bathing or swimming.   Purchase inexpensive types that are most comfortable for you. You can make your own by using cotton balls mixed with a generous amount of Vaseline petroleum jelly. Gently place these in the ear canal.    > Dry the ear canal: after getting out of the shower or bath, use a hair dryer on low heat blowing in the ear for 10-15 seconds. Pulling gently backwards on the ear straightens the ear canal and allows the air to get further down.    > If you are to use ear drops, please place them in the ear canal and give them a few seconds to run down.  Follow this by blowing in the ear canal with a hair dryer set on low heat for approximately 10 to 15 seconds.  You may do this multiple times during the day to help keep the ear canal dry.    >If you are swimming frequently- place a drop of oil in each ear canal prior to entering water. After you are finished in the water, place a drop of vinegar in each ear canal. Use a hair dryer on low heat to blow in each ear canal for 10-15 seconds to dry ears out.     Call for ear problems     CONTACT INFORMATION:  The main office phone number is [phone]. For emergencies after hours and on weekends, this number will convert over to our answering service and the on call provider will answer. Please try to keep non emergent phone calls/ questions to office hours 9am-5pm Monday through Friday.     [employer]  As an alternative, you can sign up and use the Epic MyChart system for more direct and quicker access for non emergent questions/ problems.  [facility] [employer] allows you to send messages to your doctor,  view your test results, renew your prescriptions, schedule appointments, and more. To sign up, go to [email or web address] and click on the Sign Up Now link in the New User? box. Enter your "[employer] Activation Code exactly as it appears below along with the last four digits of your Social Security Number and your Date of Birth () to complete the sign-up process. If you do not sign up before the expiration date, you must request a new code.    "[employer] Activation Code: Activation code not generated  Current "[employer] Status: Active    If you have questions, you can email [email or web address] or call [phone] to talk to our "[employer] staff. Remember, "[employer] is NOT to be used for urgent needs. For medical emergencies, dial 911.

## 2023-10-10 NOTE — PROGRESS NOTES
Avel Sanchez Jr, MD  [facility]  [address], [address]  [place] [address]  Fax [phone]  Phone [phone]      Visit Type: IN OFFICE PROCEDURE   Chief Complaint   Patient presents with    IOP-bilateral myringotomy with tube placement         HPI  Accompanied by: No one  She presents for a follow up evaluation.  Patient has had no hearing changes since last examined.  She wishes to proceed with tubes    Past Medical History:   Diagnosis Date    HL (hearing loss) 9/12/2023    Hypertension     Stroke     April 2018    SVT (supraventricular tachycardia)        Past Surgical History:   Procedure Laterality Date    NO PAST SURGERIES         Family History: Her family history includes Hypertension in her father; Stroke in her mother.     Social History: She  reports that she has never smoked. She has never used smokeless tobacco. She reports that she does not drink alcohol and does not use drugs.    Home Medications:  Azelastine HCl, Krill Oil Omega-3, aspirin, carBAMazepine XR, fluconazole, fluticasone, methylPREDNISolone, multivitamin with minerals, rosuvastatin, verapamil SR, and vitamin D    Allergies:  She has No Known Allergies.       Vital Signs:   Temp:  [98 øF (36.7 øC)] 98 øF (36.7 øC)  Heart Rate:  [138] 138  BP: (135)/(81) 135/81  ENT Physical Exam  Constitutional  Appearance: patient appears well-developed and well-nourished,  Communication/Voice: communication appropriate for developmental age; vocal quality normal;  Head and Face  Appearance: head appears normal, face appears normal and face appears atraumatic;  Palpation: facial palpation normal;  Salivary: glands normal;  Ear  Hearing: intact;  Auricles: right auricle normal; left auricle normal;  External Mastoids: right external mastoid normal; left external mastoid normal;  Ear Canals: bilateral ear canals normal;  Tympanic Membranes: right tympanic membrane  retracted; left tympanic membrane with effusion; complete and serous effusion present;  Nose  External Nose: nares patent bilaterally; external nose normal;  Oral Cavity/Oropharynx  Lips: normal;  Neck  Neck: neck normal;  Respiratory  Inspection: breathing unlabored; normal breathing rate;  Cardiovascular  Inspection: extremities are warm and well perfused; no peripheral edema present;  Neurovestibular  Mental Status: alert and oriented;  Psychiatric: mood normal; affect is appropriate;       Bilateral Myringotomy    Date/Time: 10/10/2023 2:48 PM    Performed by: Avel Sanchez Jr., MD  Authorized by: Avel Sanchez Jr., MD    Informed Consent:   Consent for the procedure was given by the patient. Alternatives were discussed, including no treatment. After the discussion of the risks and benefits, questions were allowed and answered until understanding was demonstrated. Consent to perform the procedure was obtained by verbal consent consent.       Indications:  Bilateral myringotomy was felt to be indicated for failed medical management, eustachian tube dysfunction and otitis media with effusion.     Procedure:  The ear canal and tympanic membrane were visualized with the otologic microscope. A bilateral myringotomy was performed. After anesthesia, an incision was made in the bilateral posterior inferior tympanic membrane. The middle ear was inspected and noted to have serous effusion (Left-sided serous effusion; right side no effusion). The procedure was tolerated well with no immediate complications.      Bilateral myringotomies carried out  Left ear with serous yellow middle ear effusion  Right ear with no effusion but retraction tympanic membrane  Improved hearing on both sides after myringotomy     Result Review    RESULTS REVIEW    I have reviewed the patients old records in the chart.   I have reviewed the patients old records in the chart.  The following results/records were reviewed:      Assessment & Plan    Diagnoses and all orders for this visit:    1. Mixed conductive and sensorineural hearing loss of both ears (Primary)  -     $ Myringotomy    2. Tinnitus of both ears    3. Dysfunction of both eustachian tubes  -     $ Myringotomy    4. ETD (Eustachian tube dysfunction), bilateral  -     $ Myringotomy    5. Recurrent acute serous otitis media of both ears  -     $ Myringotomy    6. Decreased hearing of both ears    7. Bilateral otitis media with effusion  -     $ Myringotomy       Medical and surgical options were discussed including observation, continued medical management, medication modification, and surgical management. Risks, benefits and alternatives were discussed and questions were answered. After considering the options, the patient decided to proceed with surgical management.  Patient has had myringotomies placed today.  I do not feel tubes are indicated at this point time.  I will see if myringotomies will rest the eustachian tubes and allow this to begin working again.  This first time for this patient.  Water precautions  Pop ears  Call for any problems    My Chart:  Patient is using My Chart    Patient understand(s) and agree(s) with the treatment plan as described.    Return in about 6 weeks (around 11/21/2023) for Recheck ears.      Electronically signed by Avel Sanchez Jr, MD 10/10/23 6:10 PM CDT.

## 2023-10-31 ENCOUNTER — APPOINTMENT (OUTPATIENT)
Dept: GENERAL RADIOLOGY | Facility: HOSPITAL | Age: 63
End: 2023-10-31
Payer: COMMERCIAL

## 2023-10-31 ENCOUNTER — HOSPITAL ENCOUNTER (EMERGENCY)
Facility: HOSPITAL | Age: 63
Discharge: HOME | End: 2023-10-31
Attending: INTERNAL MEDICINE
Payer: COMMERCIAL

## 2023-10-31 VITALS
HEART RATE: 83 BPM | DIASTOLIC BLOOD PRESSURE: 71 MMHG | SYSTOLIC BLOOD PRESSURE: 109 MMHG | RESPIRATION RATE: 18 BRPM | TEMPERATURE: 97.8 F | WEIGHT: 140 LBS | BODY MASS INDEX: 23.9 KG/M2 | HEIGHT: 64 IN | OXYGEN SATURATION: 96 %

## 2023-10-31 DIAGNOSIS — I47.10 SVT (SUPRAVENTRICULAR TACHYCARDIA): Primary | ICD-10-CM

## 2023-10-31 DIAGNOSIS — E03.9 HYPOTHYROIDISM, UNSPECIFIED TYPE: ICD-10-CM

## 2023-10-31 LAB
ALBUMIN SERPL-MCNC: 4.6 G/DL (ref 3.5–5.2)
ALBUMIN/GLOB SERPL: 1.6 G/DL
ALP SERPL-CCNC: 90 U/L (ref 39–117)
ALT SERPL W P-5'-P-CCNC: 22 U/L (ref 1–33)
ANION GAP SERPL CALCULATED.3IONS-SCNC: 11 MMOL/L (ref 5–15)
AST SERPL-CCNC: 24 U/L (ref 1–32)
BASOPHILS # BLD AUTO: 0.05 10*3/MM3 (ref 0–0.2)
BASOPHILS NFR BLD AUTO: 0.5 % (ref 0–1.5)
BILIRUB SERPL-MCNC: <0.2 MG/DL (ref 0–1.2)
BUN SERPL-MCNC: 21 MG/DL (ref 8–23)
BUN/CREAT SERPL: 25.9 (ref 7–25)
CALCIUM SPEC-SCNC: 9.4 MG/DL (ref 8.6–10.5)
CARBAMAZEPINE SERPL-MCNC: 5 MCG/ML (ref 4–12)
CHLORIDE SERPL-SCNC: 103 MMOL/L (ref 98–107)
CO2 SERPL-SCNC: 28 MMOL/L (ref 22–29)
CREAT SERPL-MCNC: 0.81 MG/DL (ref 0.57–1)
DEPRECATED RDW RBC AUTO: 46.6 FL (ref 37–54)
EGFRCR SERPLBLD CKD-EPI 2021: 80.7 ML/MIN/1.73
EOSINOPHIL # BLD AUTO: 0.21 10*3/MM3 (ref 0–0.4)
EOSINOPHIL NFR BLD AUTO: 2.1 % (ref 0.3–6.2)
ERYTHROCYTE [DISTWIDTH] IN BLOOD BY AUTOMATED COUNT: 13.8 % (ref 12.3–15.4)
GEN 5 2HR TROPONIN T REFLEX: 21 NG/L
GLOBULIN UR ELPH-MCNC: 2.9 GM/DL
GLUCOSE SERPL-MCNC: 149 MG/DL (ref 65–99)
HCT VFR BLD AUTO: 46.6 % (ref 34–46.6)
HGB BLD-MCNC: 14.6 G/DL (ref 12–15.9)
HOLD SPECIMEN: NORMAL
HOLD SPECIMEN: NORMAL
IMM GRANULOCYTES # BLD AUTO: 0.02 10*3/MM3 (ref 0–0.05)
IMM GRANULOCYTES NFR BLD AUTO: 0.2 % (ref 0–0.5)
LYMPHOCYTES # BLD AUTO: 3.72 10*3/MM3 (ref 0.7–3.1)
LYMPHOCYTES NFR BLD AUTO: 37.5 % (ref 19.6–45.3)
MAGNESIUM SERPL-MCNC: 2.4 MG/DL (ref 1.6–2.4)
MCH RBC QN AUTO: 28.9 PG (ref 26.6–33)
MCHC RBC AUTO-ENTMCNC: 31.3 G/DL (ref 31.5–35.7)
MCV RBC AUTO: 92.1 FL (ref 79–97)
MONOCYTES # BLD AUTO: 0.76 10*3/MM3 (ref 0.1–0.9)
MONOCYTES NFR BLD AUTO: 7.7 % (ref 5–12)
NEUTROPHILS NFR BLD AUTO: 5.15 10*3/MM3 (ref 1.7–7)
NEUTROPHILS NFR BLD AUTO: 52 % (ref 42.7–76)
NRBC BLD AUTO-RTO: 0 /100 WBC (ref 0–0.2)
PLATELET # BLD AUTO: 404 10*3/MM3 (ref 140–450)
PMV BLD AUTO: 9.1 FL (ref 6–12)
POTASSIUM SERPL-SCNC: 4 MMOL/L (ref 3.5–5.2)
PROT SERPL-MCNC: 7.5 G/DL (ref 6–8.5)
RBC # BLD AUTO: 5.06 10*6/MM3 (ref 3.77–5.28)
SODIUM SERPL-SCNC: 142 MMOL/L (ref 136–145)
T4 FREE SERPL-MCNC: 0.81 NG/DL (ref 0.93–1.7)
TROPONIN T DELTA: 14 NG/L
TROPONIN T SERPL HS-MCNC: 7 NG/L
TSH SERPL DL<=0.05 MIU/L-ACNC: 9.03 UIU/ML (ref 0.27–4.2)
WBC NRBC COR # BLD: 9.91 10*3/MM3 (ref 3.4–10.8)
WHOLE BLOOD HOLD COAG: NORMAL
WHOLE BLOOD HOLD SPECIMEN: NORMAL

## 2023-10-31 PROCEDURE — 80050 GENERAL HEALTH PANEL: CPT | Performed by: INTERNAL MEDICINE

## 2023-10-31 PROCEDURE — 80156 ASSAY CARBAMAZEPINE TOTAL: CPT | Performed by: INTERNAL MEDICINE

## 2023-10-31 PROCEDURE — 84484 ASSAY OF TROPONIN QUANT: CPT | Performed by: INTERNAL MEDICINE

## 2023-10-31 PROCEDURE — 83735 ASSAY OF MAGNESIUM: CPT | Performed by: INTERNAL MEDICINE

## 2023-10-31 PROCEDURE — 93010 ELECTROCARDIOGRAM REPORT: CPT | Performed by: STUDENT IN AN ORGANIZED HEALTH CARE EDUCATION/TRAINING PROGRAM

## 2023-10-31 PROCEDURE — 93005 ELECTROCARDIOGRAM TRACING: CPT | Performed by: INTERNAL MEDICINE

## 2023-10-31 PROCEDURE — 99284 EMERGENCY DEPT VISIT MOD MDM: CPT

## 2023-10-31 PROCEDURE — 84439 ASSAY OF FREE THYROXINE: CPT | Performed by: INTERNAL MEDICINE

## 2023-10-31 PROCEDURE — 71045 X-RAY EXAM CHEST 1 VIEW: CPT

## 2023-10-31 PROCEDURE — 36415 COLL VENOUS BLD VENIPUNCTURE: CPT

## 2023-10-31 PROCEDURE — 25010000002 ADENOSINE PER 6 MG

## 2023-10-31 PROCEDURE — 93005 ELECTROCARDIOGRAM TRACING: CPT

## 2023-10-31 RX ORDER — ADENOSINE 3 MG/ML
INJECTION, SOLUTION INTRAVENOUS
Status: COMPLETED
Start: 2023-10-31 | End: 2023-10-31

## 2023-10-31 RX ORDER — LEVOTHYROXINE SODIUM 0.03 MG/1
25 TABLET ORAL
Qty: 30 TABLET | Refills: 0 | Status: SHIPPED | OUTPATIENT
Start: 2023-10-31

## 2023-10-31 RX ADMIN — ADENOSINE 6 MG: 3 INJECTION INTRAVENOUS at 18:07

## 2023-10-31 NOTE — ED PROVIDER NOTES
"Subjective   History of Present Illness  63-year-old female who presents emergency department in SVT.  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  She was at work today and around 4 PM she was sitting in her desk and stood up and felt lightheaded and that she might pass out.  She noted \"here we go again.\"  She takes verapamil for her blood pressure.  She has seen Dr. Ford in the past.  Her last episode of SVT was 8/8/2022, and she was seen in our emergency department.  She has not had any episodes since that time.  She has had a cold since September.  She was seen by ENT and instead of myringotomy tubes, he just put \"holes\" in her ears.  She has not been taking any over-the-counter Sudafed.    Emergent cardioversion was taken place with adenosine.  The patient tolerated this procedure well.  Rate upon my leaving the room was 92.    Review of Systems   Cardiovascular:  Positive for palpitations.   Neurological:  Positive for light-headedness.       Past Medical History:   Diagnosis Date    HL (hearing loss) 9/12/2023    Hypertension     Stroke     April 2018    SVT (supraventricular tachycardia)        No Known Allergies    Past Surgical History:   Procedure Laterality Date    NO PAST SURGERIES         Family History   Problem Relation Age of Onset    Stroke Mother     Hypertension Father        Social History     Socioeconomic History    Marital status:    Tobacco Use    Smoking status: Never    Smokeless tobacco: Never   Vaping Use    Vaping Use: Never used   Substance and Sexual Activity    Alcohol use: No    Drug use: No    Sexual activity: Yes     Partners: Male           Objective   Physical Exam  Vitals reviewed.   Constitutional:       Appearance: Normal appearance.   HENT:      Head: Normocephalic and atraumatic.      Right Ear: External ear normal.      Left Ear: External ear normal.      Nose: Nose normal.   Eyes:      General: No scleral icterus.     Conjunctiva/sclera: Conjunctivae " normal.   Cardiovascular:      Rate and Rhythm: Normal rate and regular rhythm. Tachycardia present.      Heart sounds: Normal heart sounds.   Pulmonary:      Effort: Pulmonary effort is normal.      Breath sounds: Normal breath sounds.   Musculoskeletal:         General: No swelling or tenderness.      Cervical back: Normal range of motion and neck supple.   Skin:     General: Skin is warm and dry.   Neurological:      Mental Status: She is alert and oriented to person, place, and time.      Cranial Nerves: No cranial nerve deficit.   Psychiatric:         Mood and Affect: Mood normal.         Behavior: Behavior normal.         Procedures           ED Course  ED Course as of 10/31/23 2000   Tue Oct 31, 2023   1955 I spoke with the patient.  Gave her a copy of her labs.  We discussed her abnormal thyroid numbers.  She has had issues with thyroid nodules, and has an outpatient follow-up with ENT nurse practitioner.  Discussed I will call in a low-dose Synthroid for her and to continue with her follow-up.  She voiced understanding.  We will also see if she would be a candidate to see EP at our facility.  The patient would like to be discharged home, with outpatient follow-up. [AJ]      ED Course User Index  [AJ] Moon Sanchez DO           Lab Results (last 24 hours)       Procedure Component Value Units Date/Time    CBC & Differential [[id number]]  (Abnormal) Collected: 10/31/23 1808    Specimen: Blood Updated: 10/31/23 1819    Narrative:      The following orders were created for panel order CBC & Differential.  Procedure                               Abnormality         Status                     ---------                               -----------         ------                     CBC Auto Differential[[id number]]        Abnormal            Final result                 Please view results for these tests on the individual orders.    Comprehensive Metabolic Panel [[id number]]  (Abnormal) Collected: 10/31/23 1808     Specimen: Blood Updated: 10/31/23 1841     Glucose 149 mg/dL      BUN 21 mg/dL      Creatinine 0.81 mg/dL      Sodium 142 mmol/L      Potassium 4.0 mmol/L      Chloride 103 mmol/L      CO2 28.0 mmol/L      Calcium 9.4 mg/dL      Total Protein 7.5 g/dL      Albumin 4.6 g/dL      ALT (SGPT) 22 U/L      AST (SGOT) 24 U/L      Alkaline Phosphatase 90 U/L      Total Bilirubin <0.2 mg/dL      Globulin 2.9 gm/dL      A/G Ratio 1.6 g/dL      BUN/Creatinine Ratio 25.9     Anion Gap 11.0 mmol/L      eGFR 80.7 mL/min/1.73     Narrative:      GFR Normal >60  Chronic Kidney Disease <60  Kidney Failure <15      TSH [[id number]]  (Abnormal) Collected: 10/31/23 1808    Specimen: Blood Updated: 10/31/23 1846     TSH 9.030 uIU/mL     Magnesium [[id number]]  (Normal) Collected: 10/31/23 1808    Specimen: Blood Updated: 10/31/23 1841     Magnesium 2.4 mg/dL     High Sensitivity Troponin T [[id number]]  (Normal) Collected: 10/31/23 1808    Specimen: Blood Updated: 10/31/23 1837     HS Troponin T 7 ng/L     Narrative:      High Sensitive Troponin T Reference Range:  <10.0 ng/L- Negative Female for AMI  <15.0 ng/L- Negative Male for AMI  >=10 - Abnormal Female indicating possible myocardial injury.  >=15 - Abnormal Male indicating possible myocardial injury.   Clinicians would have to utilize clinical acumen, EKG, Troponin, and serial changes to determine if it is an Acute Myocardial Infarction or myocardial injury due to an underlying chronic condition.         Carbamazepine Level, Total [[id number]]  (Normal) Collected: 10/31/23 1808    Specimen: Blood Updated: 10/31/23 1844     Carbamazepine Level 5.0 mcg/mL     CBC Auto Differential [[id number]]  (Abnormal) Collected: 10/31/23 1808    Specimen: Blood Updated: 10/31/23 1819     WBC 9.91 10*3/mm3      RBC 5.06 10*6/mm3      Hemoglobin 14.6 g/dL      Hematocrit 46.6 %      MCV 92.1 fL      MCH 28.9 pg      MCHC 31.3 g/dL      RDW 13.8 %      RDW-SD 46.6 fl      MPV 9.1 fL      Platelets  404 10*3/mm3      Neutrophil % 52.0 %      Lymphocyte % 37.5 %      Monocyte % 7.7 %      Eosinophil % 2.1 %      Basophil % 0.5 %      Immature Grans % 0.2 %      Neutrophils, Absolute 5.15 10*3/mm3      Lymphocytes, Absolute 3.72 10*3/mm3      Monocytes, Absolute 0.76 10*3/mm3      Eosinophils, Absolute 0.21 10*3/mm3      Basophils, Absolute 0.05 10*3/mm3      Immature Grans, Absolute 0.02 10*3/mm3      nRBC 0.0 /100 WBC     T4, Free [[id number]]  (Abnormal) Collected: 10/31/23 1808    Specimen: Blood Updated: 10/31/23 1941     Free T4 0.81 ng/dL     Narrative:      Results may be falsely increased if patient taking Biotin.            XR Chest 1 View   Final Result   1.  No acute process in the chest.       This report was signed and finalized on 10/31/2023 6:51 PM CDT by Dr Maurisio Urbina.                                            Medical Decision Making  Problems Addressed:  Hypothyroidism, unspecified type: complicated acute illness or injury  SVT (supraventricular tachycardia): complicated acute illness or injury    Amount and/or Complexity of Data Reviewed  Labs: ordered.  Radiology: ordered.  ECG/medicine tests: ordered.    Risk  Prescription drug management.        Final diagnoses:   SVT (supraventricular tachycardia)   Hypothyroidism, unspecified type       ED Disposition  ED Disposition       ED Disposition   Discharge    Condition   Stable    Comment   --               Fidel oHskins MD  [address] [facility] [address]  [phone]    In 2 days           Medication List        New Prescriptions      levothyroxine 25 MCG tablet  Commonly known as: Synthroid  Take 1 tablet by mouth Every Morning.               Where to Get Your Medications        These medications were sent to [facility] - [address], [facility] [address]      Hours: Monday to Friday 7 AM to 5 PM (Closed 12:30 PM to 1 PM) Phone: [phone]   levothyroxine 25 MCG tablet             Moon Sanchez,   10/31/23 2000

## 2023-11-01 ENCOUNTER — HOSPITAL ENCOUNTER (EMERGENCY)
Facility: HOSPITAL | Age: 63
Discharge: HOME | End: 2023-11-01
Attending: STUDENT IN AN ORGANIZED HEALTH CARE EDUCATION/TRAINING PROGRAM | Admitting: STUDENT IN AN ORGANIZED HEALTH CARE EDUCATION/TRAINING PROGRAM
Payer: COMMERCIAL

## 2023-11-01 VITALS
OXYGEN SATURATION: 97 % | WEIGHT: 143 LBS | HEIGHT: 64 IN | HEART RATE: 79 BPM | TEMPERATURE: 97.8 F | RESPIRATION RATE: 14 BRPM | SYSTOLIC BLOOD PRESSURE: 124 MMHG | DIASTOLIC BLOOD PRESSURE: 81 MMHG | BODY MASS INDEX: 24.41 KG/M2

## 2023-11-01 DIAGNOSIS — I47.10 SVT (SUPRAVENTRICULAR TACHYCARDIA): Primary | ICD-10-CM

## 2023-11-01 LAB
ALBUMIN SERPL-MCNC: 4.5 G/DL (ref 3.5–5.2)
ALBUMIN/GLOB SERPL: 1.6 G/DL
ALP SERPL-CCNC: 88 U/L (ref 39–117)
ALT SERPL W P-5'-P-CCNC: 18 U/L (ref 1–33)
ANION GAP SERPL CALCULATED.3IONS-SCNC: 10 MMOL/L (ref 5–15)
AST SERPL-CCNC: 14 U/L (ref 1–32)
BASOPHILS # BLD AUTO: 0.03 10*3/MM3 (ref 0–0.2)
BASOPHILS NFR BLD AUTO: 0.4 % (ref 0–1.5)
BILIRUB SERPL-MCNC: 0.3 MG/DL (ref 0–1.2)
BUN SERPL-MCNC: 17 MG/DL (ref 8–23)
BUN/CREAT SERPL: 24.6 (ref 7–25)
CALCIUM SPEC-SCNC: 9.3 MG/DL (ref 8.6–10.5)
CARBAMAZEPINE SERPL-MCNC: 4.7 MCG/ML (ref 4–12)
CHLORIDE SERPL-SCNC: 103 MMOL/L (ref 98–107)
CO2 SERPL-SCNC: 28 MMOL/L (ref 22–29)
CREAT SERPL-MCNC: 0.69 MG/DL (ref 0.57–1)
DEPRECATED RDW RBC AUTO: 46.1 FL (ref 37–54)
EGFRCR SERPLBLD CKD-EPI 2021: 96.5 ML/MIN/1.73
EOSINOPHIL # BLD AUTO: 0.11 10*3/MM3 (ref 0–0.4)
EOSINOPHIL NFR BLD AUTO: 1.3 % (ref 0.3–6.2)
ERYTHROCYTE [DISTWIDTH] IN BLOOD BY AUTOMATED COUNT: 13.5 % (ref 12.3–15.4)
GLOBULIN UR ELPH-MCNC: 2.9 GM/DL
GLUCOSE SERPL-MCNC: 114 MG/DL (ref 65–99)
HCT VFR BLD AUTO: 46 % (ref 34–46.6)
HGB BLD-MCNC: 14.2 G/DL (ref 12–15.9)
IMM GRANULOCYTES # BLD AUTO: 0.02 10*3/MM3 (ref 0–0.05)
IMM GRANULOCYTES NFR BLD AUTO: 0.2 % (ref 0–0.5)
LYMPHOCYTES # BLD AUTO: 3.59 10*3/MM3 (ref 0.7–3.1)
LYMPHOCYTES NFR BLD AUTO: 43.4 % (ref 19.6–45.3)
MAGNESIUM SERPL-MCNC: 2.3 MG/DL (ref 1.6–2.4)
MCH RBC QN AUTO: 28.5 PG (ref 26.6–33)
MCHC RBC AUTO-ENTMCNC: 30.9 G/DL (ref 31.5–35.7)
MCV RBC AUTO: 92.2 FL (ref 79–97)
MONOCYTES # BLD AUTO: 0.53 10*3/MM3 (ref 0.1–0.9)
MONOCYTES NFR BLD AUTO: 6.4 % (ref 5–12)
NEUTROPHILS NFR BLD AUTO: 4 10*3/MM3 (ref 1.7–7)
NEUTROPHILS NFR BLD AUTO: 48.3 % (ref 42.7–76)
NRBC BLD AUTO-RTO: 0 /100 WBC (ref 0–0.2)
PLATELET # BLD AUTO: 369 10*3/MM3 (ref 140–450)
PMV BLD AUTO: 9 FL (ref 6–12)
POTASSIUM SERPL-SCNC: 3.7 MMOL/L (ref 3.5–5.2)
PROT SERPL-MCNC: 7.4 G/DL (ref 6–8.5)
RBC # BLD AUTO: 4.99 10*6/MM3 (ref 3.77–5.28)
SODIUM SERPL-SCNC: 141 MMOL/L (ref 136–145)
WBC NRBC COR # BLD: 8.28 10*3/MM3 (ref 3.4–10.8)

## 2023-11-01 PROCEDURE — 85025 COMPLETE CBC W/AUTO DIFF WBC: CPT | Performed by: STUDENT IN AN ORGANIZED HEALTH CARE EDUCATION/TRAINING PROGRAM

## 2023-11-01 PROCEDURE — 93005 ELECTROCARDIOGRAM TRACING: CPT | Performed by: STUDENT IN AN ORGANIZED HEALTH CARE EDUCATION/TRAINING PROGRAM

## 2023-11-01 PROCEDURE — 96374 THER/PROPH/DIAG INJ IV PUSH: CPT

## 2023-11-01 PROCEDURE — 93010 ELECTROCARDIOGRAM REPORT: CPT | Performed by: STUDENT IN AN ORGANIZED HEALTH CARE EDUCATION/TRAINING PROGRAM

## 2023-11-01 PROCEDURE — 83735 ASSAY OF MAGNESIUM: CPT | Performed by: STUDENT IN AN ORGANIZED HEALTH CARE EDUCATION/TRAINING PROGRAM

## 2023-11-01 PROCEDURE — 25010000002 ADENOSINE PER 6 MG: Performed by: STUDENT IN AN ORGANIZED HEALTH CARE EDUCATION/TRAINING PROGRAM

## 2023-11-01 PROCEDURE — 80156 ASSAY CARBAMAZEPINE TOTAL: CPT | Performed by: STUDENT IN AN ORGANIZED HEALTH CARE EDUCATION/TRAINING PROGRAM

## 2023-11-01 PROCEDURE — 80053 COMPREHEN METABOLIC PANEL: CPT | Performed by: STUDENT IN AN ORGANIZED HEALTH CARE EDUCATION/TRAINING PROGRAM

## 2023-11-01 PROCEDURE — 99283 EMERGENCY DEPT VISIT LOW MDM: CPT

## 2023-11-01 PROCEDURE — 25810000003 LACTATED RINGERS SOLUTION: Performed by: STUDENT IN AN ORGANIZED HEALTH CARE EDUCATION/TRAINING PROGRAM

## 2023-11-01 RX ORDER — ADENOSINE 3 MG/ML
6 INJECTION, SOLUTION INTRAVENOUS ONCE
Status: COMPLETED | OUTPATIENT
Start: 2023-11-01 | End: 2023-11-01

## 2023-11-01 RX ADMIN — SODIUM CHLORIDE, POTASSIUM CHLORIDE, SODIUM LACTATE AND CALCIUM CHLORIDE 1000 ML: 600; 310; 30; 20 INJECTION, SOLUTION INTRAVENOUS at 17:53

## 2023-11-01 RX ADMIN — ADENOSINE 6 MG: 3 INJECTION INTRAVENOUS at 18:30

## 2023-11-01 NOTE — ED PROVIDER NOTES
Subjective   History of Present Illness  Patient presents due to tachycardia.  Longstanding history of SVT.  Was cardioverted here with adenosine yesterday and went back in SVT today.  Had initial lightheadedness like she always does but then feels well without shortness of breath chest pain or syncope.  Denies other interim symptoms.  No recent medication changes.  Sees her doctor tomorrow and then the electrophysiologist on Friday.    Review of Systems   Constitutional:  Negative for chills and fever.   Respiratory:  Negative for cough and shortness of breath.    Cardiovascular:  Positive for palpitations. Negative for chest pain.   Gastrointestinal:  Negative for abdominal pain and vomiting.   Genitourinary:  Negative for difficulty urinating and dysuria.   Neurological:  Negative for syncope and light-headedness.       Past Medical History:   Diagnosis Date    HL (hearing loss) 9/12/2023    Hypertension     Stroke     April 2018    SVT (supraventricular tachycardia)        No Known Allergies    Past Surgical History:   Procedure Laterality Date    NO PAST SURGERIES         Family History   Problem Relation Age of Onset    Stroke Mother     Hypertension Father        Social History     Socioeconomic History    Marital status:    Tobacco Use    Smoking status: Never    Smokeless tobacco: Never   Vaping Use    Vaping Use: Never used   Substance and Sexual Activity    Alcohol use: No    Drug use: No    Sexual activity: Yes     Partners: Male           Objective   Physical Exam  Vitals reviewed.   Constitutional:       General: She is not in acute distress.  HENT:      Head: Normocephalic and atraumatic.   Eyes:      Extraocular Movements: Extraocular movements intact.      Conjunctiva/sclera: Conjunctivae normal.   Cardiovascular:      Pulses: Normal pulses.      Heart sounds: Normal heart sounds.   Pulmonary:      Effort: Pulmonary effort is normal. No respiratory distress.      Breath sounds: No wheezing.    Abdominal:      General: Abdomen is flat. There is no distension.      Tenderness: There is no abdominal tenderness. There is no guarding.   Musculoskeletal:      Cervical back: Normal range of motion and neck supple.   Skin:     General: Skin is warm and dry.   Neurological:      General: No focal deficit present.      Mental Status: She is alert. Mental status is at baseline.   Psychiatric:         Behavior: Behavior normal.         Thought Content: Thought content normal.         Chemical Cardioversion    Date/Time: 11/1/2023 6:26 PM    Performed by: Alex Vásquez MD  Authorized by: Alex Vásquez MD    Consent:     Consent obtained:  Verbal    Consent given by:  Patient    Risks discussed:  Induced arrhythmia  Pre-procedure details:     Cardioversion basis:  Urgent    Rhythm:  Supraventricular tachycardia  Attempt one:     Cardioversion medication:  Adenosine 6mg      Medication outcome:  No change in rhythm  Post-procedure details:     Patient status:  Awake    Patient tolerance of procedure:  Tolerated well, no immediate complications             ED Course                                           Medical Decision Making  Problems Addressed:  SVT (supraventricular tachycardia): complicated acute illness or injury    Amount and/or Complexity of Data Reviewed  Labs: ordered.  ECG/medicine tests: ordered.    Risk  Prescription drug management.      Candida Powell is a 63 y.o. female with PMH above who presents to the Emergency Department with SVT.  History and physical does not reveal any new focal finding.  EKG looks like narrow regular tachycardia consistent with SVT.  Adenosine resolved her symptoms.  Electrolytes are stable.  Offered admission but she prefers discharge.  She sees her doctor tomorrow. No further workup indicated at this time. Patient is stable and appropriate for discharge. Patient received strict return precautions per discharge instructions and was instructed to  follow-up with their primary care provider regarding their ER visit.      Final diagnosis: SVT    All questions answered. Patient/family was understanding and in agreement with today's assessment and plan. The patient was monitored during their stay in the ED and dispositioned without acute event.    Electronically signed by:  Alex Vásquez MD 11/1/2023 19:57 CDT      Note: Dragon medical dictation software was used in the creation of this note.        Final diagnoses:   SVT (supraventricular tachycardia)       ED Disposition  ED Disposition       ED Disposition   Discharge    Condition   Stable    Comment   --               Fidel Hoskins MD  [address] [facility] [place] [address]  [phone]               Medication List      No changes were made to your prescriptions during this visit.            Alex Vásquez MD  11/01/23 1957

## 2023-11-01 NOTE — DISCHARGE INSTRUCTIONS
Please keep appointment with ENT as scheduled.  Follow-up with Dr. Rios, if he is available this week.  Please reach out to STEVE Turcios.  Possibly establish care in his office.  Your thyroid labs were found to be off this evening.  I called in Synthroid to your pharmacy, as we discussed.  Please reach out if you have any concerns or if there is anything I can do for you.

## 2023-11-01 NOTE — DISCHARGE INSTRUCTIONS
Please return if you have worsening or new concerning symptoms, or if you develop other emergent concerns. Otherwise please follow-up with your primary care doctor and Dr. Welch regarding your ER visit today.

## 2023-11-03 ENCOUNTER — OFFICE VISIT (OUTPATIENT)
Dept: CARDIOLOGY | Facility: CLINIC | Age: 63
End: 2023-11-03
Payer: COMMERCIAL

## 2023-11-03 VITALS
HEART RATE: 69 BPM | BODY MASS INDEX: 24.24 KG/M2 | WEIGHT: 142 LBS | RESPIRATION RATE: 18 BRPM | HEIGHT: 64 IN | SYSTOLIC BLOOD PRESSURE: 132 MMHG | OXYGEN SATURATION: 98 % | DIASTOLIC BLOOD PRESSURE: 82 MMHG

## 2023-11-03 DIAGNOSIS — I47.10 SUSTAINED SVT: Primary | ICD-10-CM

## 2023-11-03 PROCEDURE — 93000 ELECTROCARDIOGRAM COMPLETE: CPT | Performed by: STUDENT IN AN ORGANIZED HEALTH CARE EDUCATION/TRAINING PROGRAM

## 2023-11-03 PROCEDURE — 99204 OFFICE O/P NEW MOD 45 MIN: CPT | Performed by: STUDENT IN AN ORGANIZED HEALTH CARE EDUCATION/TRAINING PROGRAM

## 2023-11-05 LAB
QT INTERVAL: 268 MS
QT INTERVAL: 288 MS
QT INTERVAL: 354 MS
QT INTERVAL: 384 MS
QTC INTERVAL: 453 MS
QTC INTERVAL: 456 MS
QTC INTERVAL: 464 MS
QTC INTERVAL: 468 MS

## 2023-11-07 ENCOUNTER — TELEPHONE (OUTPATIENT)
Dept: CARDIOLOGY | Facility: CLINIC | Age: 63
End: 2023-11-07
Payer: COMMERCIAL

## 2023-11-07 ENCOUNTER — PREP FOR SURGERY (OUTPATIENT)
Dept: OTHER | Facility: HOSPITAL | Age: 63
End: 2023-11-07
Payer: COMMERCIAL

## 2023-11-07 DIAGNOSIS — I47.10 SUPRAVENTRICULAR TACHYCARDIA: Primary | ICD-10-CM

## 2023-11-07 RX ORDER — SODIUM CHLORIDE 0.9 % (FLUSH) 0.9 %
10 SYRINGE (ML) INJECTION EVERY 12 HOURS SCHEDULED
OUTPATIENT
Start: 2023-11-07

## 2023-11-07 RX ORDER — SODIUM CHLORIDE 9 MG/ML
40 INJECTION, SOLUTION INTRAVENOUS AS NEEDED
OUTPATIENT
Start: 2023-11-07

## 2023-11-07 RX ORDER — SODIUM CHLORIDE 0.9 % (FLUSH) 0.9 %
10 SYRINGE (ML) INJECTION AS NEEDED
OUTPATIENT
Start: 2023-11-07

## 2023-11-15 ENCOUNTER — HOSPITAL ENCOUNTER (EMERGENCY)
Facility: HOSPITAL | Age: 63
Discharge: HOME | End: 2023-11-15
Attending: EMERGENCY MEDICINE
Payer: COMMERCIAL

## 2023-11-15 VITALS
RESPIRATION RATE: 18 BRPM | BODY MASS INDEX: 24.24 KG/M2 | DIASTOLIC BLOOD PRESSURE: 68 MMHG | HEART RATE: 73 BPM | WEIGHT: 142 LBS | HEIGHT: 64 IN | SYSTOLIC BLOOD PRESSURE: 119 MMHG | OXYGEN SATURATION: 99 % | TEMPERATURE: 97.8 F

## 2023-11-15 DIAGNOSIS — I47.10 SVT (SUPRAVENTRICULAR TACHYCARDIA): Primary | ICD-10-CM

## 2023-11-15 LAB
ALBUMIN SERPL-MCNC: 4.1 G/DL (ref 3.5–5.2)
ALBUMIN/GLOB SERPL: 1.4 G/DL
ALP SERPL-CCNC: 82 U/L (ref 39–117)
ALT SERPL W P-5'-P-CCNC: 32 U/L (ref 1–33)
ANION GAP SERPL CALCULATED.3IONS-SCNC: 9 MMOL/L (ref 5–15)
AST SERPL-CCNC: 34 U/L (ref 1–32)
BASOPHILS # BLD AUTO: 0.02 10*3/MM3 (ref 0–0.2)
BASOPHILS NFR BLD AUTO: 0.2 % (ref 0–1.5)
BILIRUB SERPL-MCNC: 0.3 MG/DL (ref 0–1.2)
BUN SERPL-MCNC: 20 MG/DL (ref 8–23)
BUN/CREAT SERPL: 26 (ref 7–25)
CALCIUM SPEC-SCNC: 9.2 MG/DL (ref 8.6–10.5)
CHLORIDE SERPL-SCNC: 105 MMOL/L (ref 98–107)
CO2 SERPL-SCNC: 26 MMOL/L (ref 22–29)
CREAT SERPL-MCNC: 0.77 MG/DL (ref 0.57–1)
DEPRECATED RDW RBC AUTO: 48.9 FL (ref 37–54)
EGFRCR SERPLBLD CKD-EPI 2021: 85.7 ML/MIN/1.73
EOSINOPHIL # BLD AUTO: 0.04 10*3/MM3 (ref 0–0.4)
EOSINOPHIL NFR BLD AUTO: 0.4 % (ref 0.3–6.2)
ERYTHROCYTE [DISTWIDTH] IN BLOOD BY AUTOMATED COUNT: 14.1 % (ref 12.3–15.4)
GLOBULIN UR ELPH-MCNC: 2.9 GM/DL
GLUCOSE SERPL-MCNC: 137 MG/DL (ref 65–99)
HCT VFR BLD AUTO: 45.1 % (ref 34–46.6)
HGB BLD-MCNC: 13.8 G/DL (ref 12–15.9)
IMM GRANULOCYTES # BLD AUTO: 0.02 10*3/MM3 (ref 0–0.05)
IMM GRANULOCYTES NFR BLD AUTO: 0.2 % (ref 0–0.5)
INR PPP: 0.92 (ref 0.91–1.09)
LYMPHOCYTES # BLD AUTO: 1.85 10*3/MM3 (ref 0.7–3.1)
LYMPHOCYTES NFR BLD AUTO: 18.2 % (ref 19.6–45.3)
MAGNESIUM SERPL-MCNC: 2.4 MG/DL (ref 1.6–2.4)
MCH RBC QN AUTO: 28.6 PG (ref 26.6–33)
MCHC RBC AUTO-ENTMCNC: 30.6 G/DL (ref 31.5–35.7)
MCV RBC AUTO: 93.6 FL (ref 79–97)
MONOCYTES # BLD AUTO: 0.44 10*3/MM3 (ref 0.1–0.9)
MONOCYTES NFR BLD AUTO: 4.3 % (ref 5–12)
NEUTROPHILS NFR BLD AUTO: 7.8 10*3/MM3 (ref 1.7–7)
NEUTROPHILS NFR BLD AUTO: 76.7 % (ref 42.7–76)
NRBC BLD AUTO-RTO: 0 /100 WBC (ref 0–0.2)
PLATELET # BLD AUTO: 355 10*3/MM3 (ref 140–450)
PMV BLD AUTO: 9.3 FL (ref 6–12)
POTASSIUM SERPL-SCNC: 4.6 MMOL/L (ref 3.5–5.2)
PROT SERPL-MCNC: 7 G/DL (ref 6–8.5)
PROTHROMBIN TIME: 12.4 SECONDS (ref 11.8–14.8)
RBC # BLD AUTO: 4.82 10*6/MM3 (ref 3.77–5.28)
SODIUM SERPL-SCNC: 140 MMOL/L (ref 136–145)
WBC NRBC COR # BLD: 10.17 10*3/MM3 (ref 3.4–10.8)

## 2023-11-15 PROCEDURE — 25010000002 ADENOSINE PER 6 MG: Performed by: EMERGENCY MEDICINE

## 2023-11-15 PROCEDURE — 36415 COLL VENOUS BLD VENIPUNCTURE: CPT

## 2023-11-15 PROCEDURE — 93010 ELECTROCARDIOGRAM REPORT: CPT | Performed by: HOSPITALIST

## 2023-11-15 PROCEDURE — 80053 COMPREHEN METABOLIC PANEL: CPT | Performed by: EMERGENCY MEDICINE

## 2023-11-15 PROCEDURE — 93005 ELECTROCARDIOGRAM TRACING: CPT

## 2023-11-15 PROCEDURE — 99283 EMERGENCY DEPT VISIT LOW MDM: CPT

## 2023-11-15 PROCEDURE — 93005 ELECTROCARDIOGRAM TRACING: CPT | Performed by: EMERGENCY MEDICINE

## 2023-11-15 PROCEDURE — 96374 THER/PROPH/DIAG INJ IV PUSH: CPT

## 2023-11-15 PROCEDURE — 83735 ASSAY OF MAGNESIUM: CPT | Performed by: EMERGENCY MEDICINE

## 2023-11-15 PROCEDURE — 25810000003 LACTATED RINGERS SOLUTION: Performed by: EMERGENCY MEDICINE

## 2023-11-15 PROCEDURE — 85025 COMPLETE CBC W/AUTO DIFF WBC: CPT | Performed by: EMERGENCY MEDICINE

## 2023-11-15 PROCEDURE — 85610 PROTHROMBIN TIME: CPT | Performed by: EMERGENCY MEDICINE

## 2023-11-15 RX ORDER — ADENOSINE 3 MG/ML
6 INJECTION, SOLUTION INTRAVENOUS ONCE
Status: COMPLETED | OUTPATIENT
Start: 2023-11-15 | End: 2023-11-15

## 2023-11-15 RX ADMIN — ADENOSINE 6 MG: 3 INJECTION INTRAVENOUS at 07:53

## 2023-11-15 RX ADMIN — SODIUM CHLORIDE, POTASSIUM CHLORIDE, SODIUM LACTATE AND CALCIUM CHLORIDE 1000 ML: 600; 310; 30; 20 INJECTION, SOLUTION INTRAVENOUS at 07:51

## 2023-11-15 NOTE — ED PROVIDER NOTES
Subjective   History of Present Illness  63-year-old female presents to the ED with complaint of recurrent SVT.  She has a history of SVT on verapamil.  Today is the third ER visit in the past 15 days for SVT.  Previous visits resolved with adenosine.  Patient has a follow-up with electrophysiology in 1 month.  During the night, patient states she woke with palpitations, states felt previous episodes of SVT.  She tried all the home remedies with no relief so came to the ED for treatment.  No chest pain or abdominal pain.  No nausea, vomiting, or syncope.  Rates her symptoms as moderate severity, no aggravating alleviating factors.  Reports compliance with verapamil.    History provided by:  Patient      Review of Systems   All other systems reviewed and are negative.      Past Medical History:   Diagnosis Date    HL (hearing loss) 9/12/2023    Hypertension     Stroke     April 2018    SVT (supraventricular tachycardia)        No Known Allergies    Past Surgical History:   Procedure Laterality Date    NO PAST SURGERIES         Family History   Problem Relation Age of Onset    Stroke Mother     Hypertension Father        Social History     Socioeconomic History    Marital status:    Tobacco Use    Smoking status: Never    Smokeless tobacco: Never   Vaping Use    Vaping Use: Never used   Substance and Sexual Activity    Alcohol use: No    Drug use: No    Sexual activity: Yes     Partners: Male           Objective   Physical Exam  Vitals and nursing note reviewed.   Constitutional:       Appearance: Normal appearance. She is normal weight.   HENT:      Head: Normocephalic and atraumatic.      Nose: Nose normal. No congestion or rhinorrhea.      Mouth/Throat:      Mouth: Mucous membranes are moist.   Eyes:      Extraocular Movements: Extraocular movements intact.      Conjunctiva/sclera: Conjunctivae normal.      Pupils: Pupils are equal, round, and reactive to light.   Cardiovascular:      Rate and Rhythm:  Regular rhythm. Tachycardia present.      Heart sounds: Normal heart sounds. No murmur heard.  Pulmonary:      Effort: Pulmonary effort is normal.      Breath sounds: Normal breath sounds. No wheezing or rhonchi.   Abdominal:      General: Abdomen is flat. Bowel sounds are normal.      Palpations: Abdomen is soft.   Musculoskeletal:      Right lower leg: No edema.      Left lower leg: No edema.   Skin:     General: Skin is warm and dry.   Neurological:      General: No focal deficit present.      Mental Status: She is alert and oriented to person, place, and time. Mental status is at baseline.         ECG 12 Lead Rhythm Change      Date/Time: 11/15/2023 7:22 AM    Performed by: Alex Rojas MD  Authorized by: Alex Rojas MD  Interpreted by physician  Comments: SVT, rate 166, incomplete arrival to branch block, diffuse ST depression, likely rate related, no ST elevation, abnormal EKG    Chemical Cardioversion    Date/Time: 11/15/2023 7:53 AM    Performed by: Alex Rojas MD  Authorized by: Alex Rojas MD    Consent:     Consent obtained:  Written and verbal    Consent given by:  Patient    Risks discussed:  Death and induced arrhythmia    Alternatives discussed:  Observation and alternative treatment  Pre-procedure details:     Cardioversion basis:  Urgent    Rhythm:  Supraventricular tachycardia  Attempt one:     Cardioversion medication:  Adenosine 6mg      Medication outcome:  Conversion to normal sinus rhythm  Post-procedure details:     Patient status:  Alert    Patient tolerance of procedure:  Tolerated well, no immediate complications           Lab Results (last 24 hours)       Procedure Component Value Units Date/Time    CBC & Differential [[id number]]  (Abnormal) Collected: 11/15/23 0737    Specimen: Blood Updated: 11/15/23 0746    Narrative:      The following orders were created for panel order CBC & Differential.  Procedure                               Abnormality          Status                     ---------                               -----------         ------                     CBC Auto Differential[[id number]]        Abnormal            Final result                 Please view results for these tests on the individual orders.    Comprehensive Metabolic Panel [[id number]]  (Abnormal) Collected: 11/15/23 0737    Specimen: Blood Updated: 11/15/23 0805     Glucose 137 mg/dL      BUN 20 mg/dL      Creatinine 0.77 mg/dL      Sodium 140 mmol/L      Potassium 4.6 mmol/L      Comment: Specimen hemolyzed.  Result may be falsely elevated.        Chloride 105 mmol/L      CO2 26.0 mmol/L      Calcium 9.2 mg/dL      Total Protein 7.0 g/dL      Albumin 4.1 g/dL      ALT (SGPT) 32 U/L      Comment: Specimen hemolyzed.  Result may  be falsely elevated.        AST (SGOT) 34 U/L      Comment: Specimen hemolyzed.  Result may be falsely elevated.        Alkaline Phosphatase 82 U/L      Total Bilirubin 0.3 mg/dL      Globulin 2.9 gm/dL      A/G Ratio 1.4 g/dL      BUN/Creatinine Ratio 26.0     Anion Gap 9.0 mmol/L      eGFR 85.7 mL/min/1.73     Narrative:      GFR Normal >60  Chronic Kidney Disease <60  Kidney Failure <15      Protime-INR [[id number]]  (Normal) Collected: 11/15/23 0737    Specimen: Blood Updated: 11/15/23 0758     Protime 12.4 Seconds      INR 0.92    Magnesium [[id number]]  (Normal) Collected: 11/15/23 0737    Specimen: Blood Updated: 11/15/23 0759     Magnesium 2.4 mg/dL     CBC Auto Differential [[id number]]  (Abnormal) Collected: 11/15/23 0737    Specimen: Blood Updated: 11/15/23 0746     WBC 10.17 10*3/mm3      RBC 4.82 10*6/mm3      Hemoglobin 13.8 g/dL      Hematocrit 45.1 %      MCV 93.6 fL      MCH 28.6 pg      MCHC 30.6 g/dL      RDW 14.1 %      RDW-SD 48.9 fl      MPV 9.3 fL      Platelets 355 10*3/mm3      Neutrophil % 76.7 %      Lymphocyte % 18.2 %      Monocyte % 4.3 %      Eosinophil % 0.4 %      Basophil % 0.2 %      Immature Grans % 0.2 %      Neutrophils,  Absolute 7.80 10*3/mm3      Lymphocytes, Absolute 1.85 10*3/mm3      Monocytes, Absolute 0.44 10*3/mm3      Eosinophils, Absolute 0.04 10*3/mm3      Basophils, Absolute 0.02 10*3/mm3      Immature Grans, Absolute 0.02 10*3/mm3      nRBC 0.0 /100 WBC          No Radiology Exams Resulted Within Past 24 Hours   ED Course  ED Course as of 11/15/23 1014   Wed Nov 15, 2023   0846 63-year-old female with history of recurrent SVT presents to the ED with palpitations.  Found to be in SVT on arrival to the ED.  Received 6 mg adenosine IV, converted to sinus rhythm.  Case discussed with Dr. Welch with electrophysiology.  He has seen the patient has plans for ablation in just under a month.  States he will contact his office staff to see if he can have her move forward on the schedule.  In the meantime, recommend she continue verapamil 240 mg daily, add metoprolol 25 mg twice daily.  Patient was educated on modified vagal maneuvers [AW]      ED Course User Index  [AW] Alex Rojas MD                                           Medical Decision Making  Problems Addressed:  SVT (supraventricular tachycardia): complicated acute illness or injury    Amount and/or Complexity of Data Reviewed  Labs: ordered.  ECG/medicine tests: ordered and independent interpretation performed.    Risk  Prescription drug management.        Final diagnoses:   SVT (supraventricular tachycardia)       ED Disposition  ED Disposition       ED Disposition   Discharge    Condition   Stable    Comment   --               Arjun Welch MD  [address]  [facility] [id number] [phone]               Medication List        New Prescriptions      metoprolol tartrate 25 MG tablet  Commonly known as: LOPRESSOR  Take 1 tablet by mouth 2 (Two) Times a Day.               Where to Get Your Medications        These medications were sent to [facility] [place]  [address], [place] [address]      Hours:  Monday to Friday 7 AM to 5 PM (Closed 12:30 PM to 1 PM) Phone: [phone]   metoprolol tartrate 25 MG tablet            Alex Rojas MD  11/15/23 2285       Alex Rojas MD  11/15/23 0949       Alex Rojas MD  11/15/23 5204

## 2023-11-16 PROBLEM — I47.10 SUSTAINED SVT: Status: ACTIVE | Noted: 2023-11-16

## 2023-11-16 LAB
QT INTERVAL: 280 MS
QTC INTERVAL: 465 MS

## 2023-11-16 PROCEDURE — 93000 ELECTROCARDIOGRAM COMPLETE: CPT | Performed by: STUDENT IN AN ORGANIZED HEALTH CARE EDUCATION/TRAINING PROGRAM

## 2023-11-16 NOTE — PROGRESS NOTES
"EP NEW PATIENT VISIT    Chief Complaint  Rapid Heart Rate (New Patient )    Subjective        History of Present Illness    EP Problems:  1.  Sustained SVT    Cardiology Problems:  1.  Hypertension  2.  Hyperlipidemia    Medical Problems:  1.  Prior CVA    Candida Powell is a 63 y.o. female with problem list as above who presents to the clinic for evaluation of sustained SVT.  She has a history of SVT dating back for some time more recently she has had recurrent bouts which failed to be terminating with vagal maneuvers quiring ER visits.  She has had 2 within the past week.  Given these findings she was referred to EP for further evaluation.  She feels poorly when she was in SVT with shortness of breath and fatigue.    Objective   Vital Signs:  /82 (BP Location: Right arm, Patient Position: Sitting)   Pulse 69   Resp 18   Ht 162.6 cm (64\")   Wt 64.4 kg (142 lb)   SpO2 98%   BMI 24.37 kg/m²   Estimated body mass index is 24.37 kg/m² as calculated from the following:    Height as of this encounter: 162.6 cm (64\").    Weight as of this encounter: 64.4 kg (142 lb).      Physical Exam  Vitals reviewed.   Constitutional:       Appearance: Normal appearance.   Cardiovascular:      Rate and Rhythm: Normal rate and regular rhythm.      Pulses: Normal pulses.      Heart sounds: Normal heart sounds. No murmur heard.  Pulmonary:      Effort: Pulmonary effort is normal. No respiratory distress.      Breath sounds: Normal breath sounds.   Skin:     General: Skin is warm and dry.   Neurological:      General: No focal deficit present.      Mental Status: She is alert and oriented to person, place, and time.   Psychiatric:         Mood and Affect: Mood normal.         Judgment: Judgment normal.        Result Review :  The following data was reviewed by: Arjun Welch MD on 11/03/2023:  CMP          10/31/2023    18:08 11/1/2023    17:51 11/15/2023    07:37   CMP   Glucose 149  114  137    BUN 21  17  20    Creatinine " 0.81  0.69  0.77    EGFR 80.7  96.5  85.7    Sodium 142  141  140    Potassium 4.0  3.7  4.6    Chloride 103  103  105    Calcium 9.4  9.3  9.2    Total Protein 7.5  7.4  7.0    Albumin 4.6  4.5  4.1    Globulin 2.9  2.9  2.9    Total Bilirubin <0.2  0.3  0.3    Alkaline Phosphatase 90  88  82    AST (SGOT) 24  14  34    ALT (SGPT) 22  18  32    Albumin/Globulin Ratio 1.6  1.6  1.4    BUN/Creatinine Ratio 25.9  24.6  26.0    Anion Gap 11.0  10.0  9.0      CBC          10/31/2023    18:08 11/1/2023    17:51 11/15/2023    07:37   CBC   WBC 9.91  8.28  10.17    RBC 5.06  4.99  4.82    Hemoglobin 14.6  14.2  13.8    Hematocrit 46.6  46.0  45.1    MCV 92.1  92.2  93.6    MCH 28.9  28.5  28.6    MCHC 31.3  30.9  30.6    RDW 13.8  13.5  14.1    Platelets 404  369  355      TSH          10/31/2023    18:08   TSH   TSH 9.030            ECG 12 Lead    Date/Time: 11/16/2023 3:39 AM  Performed by: Arjun Welch MD    Authorized by: Arjun Welch MD  Comparison: compared with previous ECG from 11/1/2023  Similar to previous ECG  Rhythm: sinus rhythm  Rate: normal  Conduction: conduction normal  QRS axis: normal  Other: no other findings    Clinical impression: normal ECG              Assessment and Plan   Diagnoses and all orders for this visit:    1. Sustained SVT (Primary)         Candida Powell is a 63 y.o. female with problem list as above who presents to the clinic for evaluation of sustained SVT.  She has findings consistent with recurrent and symptomatic SVT prompting ER visits.  Vagal maneuvers have been ineffective in terminating these episodes reliably.  As such,  I have discussed risks, benefits, and alternatives of an electrophysiology study and ablation for supraventricular tachycardia with the patient.  Alternatives discussed include continued observation and medical management.  An electrophysiology study with ablation is an inherently high risk procedure with possible complications that include but are not  limited to vascular access complications, internal bleeding, tamponade requiring pericardiocentesis or cardiac surgery, stroke, MI, embolism, myocardial injury, injury to the normal conduction system requiring a pacemaker, and ultimately death.  We also discussed that given the uncertain nature of the diagnosis, therapeutic efficacy can be variable.  Possibilities of recurrent arrhythmias and the possible need for additional procedures and/or medical therapy was discussed. Questions asked were appropriately answered.  No guarantees were made or implied.   With this in mind, they would like additional time to decide on whether or not to proceed.    Plan:  -She will think more regarding SVT ablation  -Continue current dose of verapamil for SVT suppression           Follow Up   Return in about 3 months (around 2/3/2024).  Patient was given instructions and counseling regarding her condition or for health maintenance advice. Please see specific information pulled into the AVS if appropriate.     Part of this note may be an electronic transcription/translation of spoken language to printed text using the Dragon Dictation System.

## 2023-11-20 NOTE — DISCHARGE INSTRUCTIONS
Post-EP study Instructions    ACTIVITY    Avoid driving, operating machinery, or alcohol consumption for 24 hours after receiving sedation. In addition, avoid signing legal documents or participating in legal proceedings.    You may resume normal activities after 24 hours except for the following:    Avoid heavy lifting (no more than 10 pounds) and vigorous activities for a minimum of 7 days. This includes activities such as jogging, exercise classes, sports activities, etc.    You may resume walking and other normal activities.    Avoid sitting more than 2 consecutive hours during waking hours for the first 3 days. If you are traveling, stop for brief (5 minutes) walks every two hours.    MEDICATIONS   Start Xarelto for 30 days and then stop.  Stop metoprolol.    MEDICAL FOLLOW UP   EP clinic follow up with Elizabeth Maki on 12/12/2023..    PLEASE NOTIFY US IF YOU DEVELOP    Fever of 101 or greater during your first few days at home    The occurrence of a new, persistent cough or unexplained shortness of breath.    A groin bruise may be expected. Initial soreness and discomfort should improve over the first few days. If you continue to have discomfort or if bruising is excessive, please call us.    Patients often experience palpitations during the healing period.    Please call if you have an episode of palpitations accompanied by fast heart rate greater than 120 beats per minute for extended period that last longer than 1-2 days.    Mild chest soreness is common and may be treated with Tylenol, Advil, or Motrin.  This soreness typically worsens when taking deep breaths.  If you notice these symptoms, start one of these medications according to the package directions and continue for 2-3 days. If your symptoms are not relieved or become severe, please call us.      REASONS TO GO TO THE EMERGENCY ROOM FOR EVALUATION:   Severe chest pain  Significant shortness of breath at rest  Near passing out or passing out  episodes soon after your ablation  Symptoms of chest pain or shortness of breath associated with low blood pressure (reading less than 90 for the top number / systolic blood pressure)  Brisk bleeding or significant swelling from the groin where IVs were placed  Any concerns that an emergent or life threatening complication is occurring    If you have a clinical questions between the hours of 8am and 4pm, Monday - Friday please call the office and let us know your concern. Please leave a message if your call is not an emergency.    For emergencies, please go for evaluation at your nearest emergency department.    If you have a [employer] account, this an excellent way to communicate.  [employer] messages are checked Monday through Friday. Please allow 24-36 hours for your message to be answered.

## 2023-11-21 ENCOUNTER — TELEPHONE (OUTPATIENT)
Dept: CARDIOLOGY | Facility: CLINIC | Age: 63
End: 2023-11-21
Payer: COMMERCIAL

## 2023-11-21 NOTE — TELEPHONE ENCOUNTER
I spoke with Ms. Powell about her upcoming ablation.   She was well informed about the procedure from prior discussion with Dr. Welch. We discussed the procedure at length including risks, anesthesia, intra-op procedures, recovery, bedrest, sheath removal, discharge criteria, and normal post-procedure expectations. I answered a few remaining questions. Ms. Powell verbalized understanding and she is ready to proceed.       Genesis Shah RN

## 2023-11-22 ENCOUNTER — HOSPITAL ENCOUNTER (OUTPATIENT)
Facility: HOSPITAL | Age: 63
Setting detail: HOSPITAL OUTPATIENT SURGERY
Discharge: HOME | End: 2023-11-22
Attending: STUDENT IN AN ORGANIZED HEALTH CARE EDUCATION/TRAINING PROGRAM | Admitting: STUDENT IN AN ORGANIZED HEALTH CARE EDUCATION/TRAINING PROGRAM
Payer: COMMERCIAL

## 2023-11-22 ENCOUNTER — ANESTHESIA (OUTPATIENT)
Dept: CARDIOLOGY | Facility: HOSPITAL | Age: 63
End: 2023-11-22
Payer: COMMERCIAL

## 2023-11-22 ENCOUNTER — ANESTHESIA EVENT (OUTPATIENT)
Dept: CARDIOLOGY | Facility: HOSPITAL | Age: 63
End: 2023-11-22
Payer: COMMERCIAL

## 2023-11-22 VITALS
OXYGEN SATURATION: 100 % | SYSTOLIC BLOOD PRESSURE: 142 MMHG | DIASTOLIC BLOOD PRESSURE: 71 MMHG | TEMPERATURE: 97.2 F | BODY MASS INDEX: 24.01 KG/M2 | HEART RATE: 67 BPM | RESPIRATION RATE: 14 BRPM | WEIGHT: 140.6 LBS | HEIGHT: 64 IN

## 2023-11-22 DIAGNOSIS — I47.10 SUPRAVENTRICULAR TACHYCARDIA: ICD-10-CM

## 2023-11-22 LAB
ACT BLD: 245 SECONDS (ref 82–152)
ANION GAP SERPL CALCULATED.3IONS-SCNC: 7 MMOL/L (ref 5–15)
BASOPHILS # BLD AUTO: 0.03 10*3/MM3 (ref 0–0.2)
BASOPHILS NFR BLD AUTO: 0.5 % (ref 0–1.5)
BUN SERPL-MCNC: 14 MG/DL (ref 8–23)
BUN/CREAT SERPL: 19.4 (ref 7–25)
CALCIUM SPEC-SCNC: 8.8 MG/DL (ref 8.6–10.5)
CHLORIDE SERPL-SCNC: 104 MMOL/L (ref 98–107)
CO2 SERPL-SCNC: 30 MMOL/L (ref 22–29)
CREAT SERPL-MCNC: 0.72 MG/DL (ref 0.57–1)
DEPRECATED RDW RBC AUTO: 46.5 FL (ref 37–54)
EGFRCR SERPLBLD CKD-EPI 2021: 92.9 ML/MIN/1.73
EOSINOPHIL # BLD AUTO: 0.06 10*3/MM3 (ref 0–0.4)
EOSINOPHIL NFR BLD AUTO: 0.9 % (ref 0.3–6.2)
ERYTHROCYTE [DISTWIDTH] IN BLOOD BY AUTOMATED COUNT: 13.8 % (ref 12.3–15.4)
GLUCOSE SERPL-MCNC: 104 MG/DL (ref 65–99)
HCT VFR BLD AUTO: 44.1 % (ref 34–46.6)
HGB BLD-MCNC: 13.8 G/DL (ref 12–15.9)
IMM GRANULOCYTES # BLD AUTO: 0.01 10*3/MM3 (ref 0–0.05)
IMM GRANULOCYTES NFR BLD AUTO: 0.2 % (ref 0–0.5)
INR PPP: 0.86 (ref 0.91–1.09)
LYMPHOCYTES # BLD AUTO: 1.72 10*3/MM3 (ref 0.7–3.1)
LYMPHOCYTES NFR BLD AUTO: 26.4 % (ref 19.6–45.3)
MCH RBC QN AUTO: 28.5 PG (ref 26.6–33)
MCHC RBC AUTO-ENTMCNC: 31.3 G/DL (ref 31.5–35.7)
MCV RBC AUTO: 91.1 FL (ref 79–97)
MONOCYTES # BLD AUTO: 0.43 10*3/MM3 (ref 0.1–0.9)
MONOCYTES NFR BLD AUTO: 6.6 % (ref 5–12)
NEUTROPHILS NFR BLD AUTO: 4.26 10*3/MM3 (ref 1.7–7)
NEUTROPHILS NFR BLD AUTO: 65.4 % (ref 42.7–76)
NRBC BLD AUTO-RTO: 0 /100 WBC (ref 0–0.2)
PLATELET # BLD AUTO: 369 10*3/MM3 (ref 140–450)
PMV BLD AUTO: 9 FL (ref 6–12)
POTASSIUM SERPL-SCNC: 4.2 MMOL/L (ref 3.5–5.2)
PROTHROMBIN TIME: 11.8 SECONDS (ref 11.8–14.8)
RBC # BLD AUTO: 4.84 10*6/MM3 (ref 3.77–5.28)
SODIUM SERPL-SCNC: 141 MMOL/L (ref 136–145)
WBC NRBC COR # BLD AUTO: 6.51 10*3/MM3 (ref 3.4–10.8)

## 2023-11-22 PROCEDURE — 25010000002 PROTAMINE SULFATE PER 10 MG: Performed by: NURSE ANESTHETIST, CERTIFIED REGISTERED

## 2023-11-22 PROCEDURE — 93655 ICAR CATH ABLTJ DSCRT ARRHYT: CPT | Performed by: STUDENT IN AN ORGANIZED HEALTH CARE EDUCATION/TRAINING PROGRAM

## 2023-11-22 PROCEDURE — 93010 ELECTROCARDIOGRAM REPORT: CPT | Performed by: HOSPITALIST

## 2023-11-22 PROCEDURE — C1894 INTRO/SHEATH, NON-LASER: HCPCS | Performed by: STUDENT IN AN ORGANIZED HEALTH CARE EDUCATION/TRAINING PROGRAM

## 2023-11-22 PROCEDURE — 85347 COAGULATION TIME ACTIVATED: CPT

## 2023-11-22 PROCEDURE — 93653 COMPRE EP EVAL TX SVT: CPT | Performed by: STUDENT IN AN ORGANIZED HEALTH CARE EDUCATION/TRAINING PROGRAM

## 2023-11-22 PROCEDURE — 25810000003 SODIUM CHLORIDE 0.9 % SOLUTION 500 ML FLEX CONT: Performed by: STUDENT IN AN ORGANIZED HEALTH CARE EDUCATION/TRAINING PROGRAM

## 2023-11-22 PROCEDURE — 85610 PROTHROMBIN TIME: CPT | Performed by: STUDENT IN AN ORGANIZED HEALTH CARE EDUCATION/TRAINING PROGRAM

## 2023-11-22 PROCEDURE — 93662 INTRACARDIAC ECG (ICE): CPT | Performed by: STUDENT IN AN ORGANIZED HEALTH CARE EDUCATION/TRAINING PROGRAM

## 2023-11-22 PROCEDURE — 25010000002 HEPARIN (PORCINE) PER 1000 UNITS: Performed by: NURSE ANESTHETIST, CERTIFIED REGISTERED

## 2023-11-22 PROCEDURE — C1760 CLOSURE DEV, VASC: HCPCS | Performed by: STUDENT IN AN ORGANIZED HEALTH CARE EDUCATION/TRAINING PROGRAM

## 2023-11-22 PROCEDURE — C1732 CATH, EP, DIAG/ABL, 3D/VECT: HCPCS | Performed by: STUDENT IN AN ORGANIZED HEALTH CARE EDUCATION/TRAINING PROGRAM

## 2023-11-22 PROCEDURE — 25810000003 SODIUM CHLORIDE 0.9 % SOLUTION: Performed by: NURSE ANESTHETIST, CERTIFIED REGISTERED

## 2023-11-22 PROCEDURE — 93623 PRGRMD STIMJ&PACG IV RX NFS: CPT | Performed by: STUDENT IN AN ORGANIZED HEALTH CARE EDUCATION/TRAINING PROGRAM

## 2023-11-22 PROCEDURE — C1759 CATH, INTRA ECHOCARDIOGRAPHY: HCPCS | Performed by: STUDENT IN AN ORGANIZED HEALTH CARE EDUCATION/TRAINING PROGRAM

## 2023-11-22 PROCEDURE — 25810000003 SODIUM CHLORIDE 0.9 % SOLUTION 250 ML FLEX CONT: Performed by: NURSE ANESTHETIST, CERTIFIED REGISTERED

## 2023-11-22 PROCEDURE — 80048 BASIC METABOLIC PNL TOTAL CA: CPT | Performed by: STUDENT IN AN ORGANIZED HEALTH CARE EDUCATION/TRAINING PROGRAM

## 2023-11-22 PROCEDURE — 25010000002 PROPOFOL 10 MG/ML EMULSION: Performed by: NURSE ANESTHETIST, CERTIFIED REGISTERED

## 2023-11-22 PROCEDURE — C1730 CATH, EP, 19 OR FEW ELECT: HCPCS | Performed by: STUDENT IN AN ORGANIZED HEALTH CARE EDUCATION/TRAINING PROGRAM

## 2023-11-22 PROCEDURE — 85025 COMPLETE CBC W/AUTO DIFF WBC: CPT | Performed by: STUDENT IN AN ORGANIZED HEALTH CARE EDUCATION/TRAINING PROGRAM

## 2023-11-22 PROCEDURE — 93005 ELECTROCARDIOGRAM TRACING: CPT | Performed by: STUDENT IN AN ORGANIZED HEALTH CARE EDUCATION/TRAINING PROGRAM

## 2023-11-22 PROCEDURE — C1766 INTRO/SHEATH,STRBLE,NON-PEEL: HCPCS | Performed by: STUDENT IN AN ORGANIZED HEALTH CARE EDUCATION/TRAINING PROGRAM

## 2023-11-22 RX ORDER — SODIUM CHLORIDE 0.9 % (FLUSH) 0.9 %
10 SYRINGE (ML) INJECTION EVERY 12 HOURS SCHEDULED
Status: DISCONTINUED | OUTPATIENT
Start: 2023-11-22 | End: 2023-11-22 | Stop reason: HOSPADM

## 2023-11-22 RX ORDER — PROPOFOL 10 MG/ML
VIAL (ML) INTRAVENOUS AS NEEDED
Status: DISCONTINUED | OUTPATIENT
Start: 2023-11-22 | End: 2023-11-22 | Stop reason: SURG

## 2023-11-22 RX ORDER — SODIUM CHLORIDE 0.9 % (FLUSH) 0.9 %
10 SYRINGE (ML) INJECTION AS NEEDED
Status: DISCONTINUED | OUTPATIENT
Start: 2023-11-22 | End: 2023-11-22 | Stop reason: HOSPADM

## 2023-11-22 RX ORDER — SODIUM CHLORIDE 9 MG/ML
40 INJECTION, SOLUTION INTRAVENOUS AS NEEDED
Status: DISCONTINUED | OUTPATIENT
Start: 2023-11-22 | End: 2023-11-22 | Stop reason: HOSPADM

## 2023-11-22 RX ORDER — HEPARIN SODIUM 1000 [USP'U]/ML
INJECTION, SOLUTION INTRAVENOUS; SUBCUTANEOUS AS NEEDED
Status: DISCONTINUED | OUTPATIENT
Start: 2023-11-22 | End: 2023-11-22 | Stop reason: SURG

## 2023-11-22 RX ORDER — BUPIVACAINE HCL/0.9 % NACL/PF 0.125 %
PLASTIC BAG, INJECTION (ML) EPIDURAL AS NEEDED
Status: DISCONTINUED | OUTPATIENT
Start: 2023-11-22 | End: 2023-11-22 | Stop reason: SURG

## 2023-11-22 RX ORDER — SODIUM CHLORIDE 9 MG/ML
INJECTION, SOLUTION INTRAVENOUS CONTINUOUS PRN
Status: DISCONTINUED | OUTPATIENT
Start: 2023-11-22 | End: 2023-11-22 | Stop reason: SURG

## 2023-11-22 RX ORDER — LIDOCAINE HYDROCHLORIDE 20 MG/ML
INJECTION, SOLUTION INFILTRATION; PERINEURAL
Status: DISCONTINUED | OUTPATIENT
Start: 2023-11-22 | End: 2023-11-22 | Stop reason: HOSPADM

## 2023-11-22 RX ORDER — PROTAMINE SULFATE 10 MG/ML
INJECTION, SOLUTION INTRAVENOUS AS NEEDED
Status: DISCONTINUED | OUTPATIENT
Start: 2023-11-22 | End: 2023-11-22 | Stop reason: SURG

## 2023-11-22 RX ADMIN — Medication 300 MCG: at 08:10

## 2023-11-22 RX ADMIN — Medication 150 MCG: at 08:38

## 2023-11-22 RX ADMIN — ISOPROTERENOL HYDROCHLORIDE 2 MCG/MIN: 0.2 INJECTION, SOLUTION INTRACARDIAC; INTRAMUSCULAR; INTRAVENOUS; SUBCUTANEOUS at 08:06

## 2023-11-22 RX ADMIN — HEPARIN SODIUM 10000 UNITS: 1000 INJECTION, SOLUTION INTRAVENOUS; SUBCUTANEOUS at 08:22

## 2023-11-22 RX ADMIN — Medication 150 MCG: at 08:29

## 2023-11-22 RX ADMIN — SODIUM CHLORIDE 40 ML: 9 INJECTION, SOLUTION INTRAVENOUS at 07:17

## 2023-11-22 RX ADMIN — Medication 150 MCG: at 08:17

## 2023-11-22 RX ADMIN — PROPOFOL 50 MG: 10 INJECTION, EMULSION INTRAVENOUS at 07:41

## 2023-11-22 RX ADMIN — SODIUM CHLORIDE: 9 INJECTION, SOLUTION INTRAVENOUS at 07:35

## 2023-11-22 RX ADMIN — PROPOFOL 100 MCG/KG/MIN: 10 INJECTION, EMULSION INTRAVENOUS at 07:41

## 2023-11-22 RX ADMIN — Medication 150 MCG: at 08:44

## 2023-11-22 RX ADMIN — PROTAMINE SULFATE 40 MG: 10 INJECTION, SOLUTION INTRAVENOUS at 09:33

## 2023-11-22 RX ADMIN — Medication 100 MCG: at 08:13

## 2023-11-22 NOTE — NURSING NOTE
Patient aaox4, VSS. Dressing to the right groin dry and intact after ambulation. Discharge instructions given, pt verbalized understanding. IV d/c'd and patient discharged home with spouse via wheelchair to personal vehicle.

## 2023-11-22 NOTE — ANESTHESIA PREPROCEDURE EVALUATION
Anesthesia Evaluation     Patient summary reviewed and Nursing notes reviewed   no history of anesthetic complications:   NPO Solid Status: > 8 hours  NPO Liquid Status: > 8 hours           Airway   Mallampati: II  TM distance: >3 FB  Neck ROM: full  No difficulty expected  Dental - normal exam     Pulmonary - negative pulmonary ROS   Cardiovascular   Exercise tolerance: good (4-7 METS)    Patient on routine beta blocker and Beta blocker given within 24 hours of surgery    (+) hypertension, hyperlipidemia      Neuro/Psych  (+) CVA (L sided face sensation & occasional L leg weakness) residual symptoms    ROS Comment: Elem  GI/Hepatic/Renal/Endo - negative ROS     Musculoskeletal (-) negative ROS    Abdominal    Substance History - negative use     OB/GYN negative ob/gyn ROS         Other                    Anesthesia Plan    ASA 2     general     intravenous induction     Anesthetic plan, risks, benefits, and alternatives have been provided, discussed and informed consent has been obtained with: patient.    Use of blood products discussed with patient .      CODE STATUS:

## 2023-11-22 NOTE — ANESTHESIA POSTPROCEDURE EVALUATION
"Patient: Candida Powell    Procedure Summary       Date: 11/22/23 Room / Location: [facility] CATH LAB  /  [facility] CATH INVASIVE LOCATION    Anesthesia Start: 0735 Anesthesia Stop: 0950    Procedure: Ablation SVT Diagnosis:       Supraventricular tachycardia      (SVT (03331))    Providers: Arjun Welch MD Provider: Lizbeth Dorado CRNA    Anesthesia Type: general ASA Status: 2            Anesthesia Type: general    Vitals  Vitals Value Taken Time   BP 97/60 11/22/23 0951   Temp     Pulse 59 11/22/23 0954   Resp     SpO2 100 % 11/22/23 0954   Vitals shown include unfiled device data.        Post Anesthesia Care and Evaluation    Patient location during evaluation: PHASE II  Patient participation: complete - patient participated  Level of consciousness: awake and alert  Pain management: adequate    Airway patency: patent  Anesthetic complications: No anesthetic complications    Cardiovascular status: acceptable  Respiratory status: acceptable  Hydration status: acceptable    Comments: Blood pressure 142/71, pulse 67, temperature 97.2 °F (36.2 °C), resp. rate 14, height 162.6 cm (64\"), weight 63.8 kg (140 lb 9.6 oz), SpO2 100%, not currently breastfeeding.        "

## 2023-11-22 NOTE — INTERVAL H&P NOTE
H&P reviewed. The patient was examined and there are no changes to the H&P.      Since the time of the last clinic visit, denies significant change in symptoms.  Denies missing any doses of her medications.  No new ER visits or hospitalizations.    Exam:  Unchanged from last clinic visit.    Labs:  Reviewed.    Assessment/plan:  Candida Powell is a 63 y.o. female who presents to the hospital for outpatient EP study and ablation for SVT.  There are no apparent contraindications to proceeding and she is medically appropriate for outpatient management with this procedure.      I have discussed risks, benefits, and alternatives of an electrophysiology study and ablation for supraventricular tachycardia with the patient.  Alternatives discussed include continued observation and medical management.  An electrophysiology study with ablation is an inherently high risk procedure with possible complications that include but are not limited to vascular access complications, internal bleeding, tamponade requiring pericardiocentesis or cardiac surgery, stroke, MI, embolism, myocardial injury, injury to the normal conduction system requiring a pacemaker, and ultimately death.  We also discussed that given the uncertain nature of the diagnosis, therapeutic efficacy can be variable.  Possibilities of recurrent arrhythmias and the possible need for additional procedures and/or medical therapy was discussed. Questions asked were appropriately answered.  No guarantees were made or implied.     Despite this, they would still like to proceed.    Plan:  - Proceed with EP study and ablation

## 2023-11-22 NOTE — Clinical Note
Hemostasis started on the right femoral vein. Vascade MVP was used in achieving hemostasis. Closure device deployed in the vessel.

## 2023-11-25 LAB
QT INTERVAL: 432 MS
QT INTERVAL: 468 MS
QTC INTERVAL: 432 MS
QTC INTERVAL: 463 MS

## 2023-11-29 RX ORDER — LEVOTHYROXINE SODIUM 0.03 MG/1
25 TABLET ORAL
Qty: 30 TABLET | Refills: 0 | OUTPATIENT
Start: 2023-11-29

## 2023-12-06 ENCOUNTER — OFFICE VISIT (OUTPATIENT)
Dept: OTOLARYNGOLOGY | Facility: CLINIC | Age: 63
End: 2023-12-06
Payer: COMMERCIAL

## 2023-12-06 ENCOUNTER — PROCEDURE VISIT (OUTPATIENT)
Dept: OTOLARYNGOLOGY | Facility: CLINIC | Age: 63
End: 2023-12-06
Payer: COMMERCIAL

## 2023-12-06 VITALS
RESPIRATION RATE: 16 BRPM | HEIGHT: 64 IN | HEART RATE: 79 BPM | DIASTOLIC BLOOD PRESSURE: 76 MMHG | TEMPERATURE: 97.9 F | SYSTOLIC BLOOD PRESSURE: 123 MMHG | BODY MASS INDEX: 23.9 KG/M2 | WEIGHT: 140 LBS

## 2023-12-06 DIAGNOSIS — E04.1 THYROID NODULE: Primary | ICD-10-CM

## 2023-12-06 DIAGNOSIS — Z98.890 HISTORY OF MYRINGOTOMY: ICD-10-CM

## 2023-12-06 DIAGNOSIS — H90.3 SENSORINEURAL HEARING LOSS, BILATERAL: Primary | ICD-10-CM

## 2023-12-06 DIAGNOSIS — H90.3 SENSORINEURAL HEARING LOSS (SNHL) OF BOTH EARS: ICD-10-CM

## 2023-12-06 DIAGNOSIS — R59.1 LYMPHADENOPATHY: ICD-10-CM

## 2023-12-06 NOTE — PROGRESS NOTES
YOB: 1960  Location: [facility]  Location Address: [address] [place] [address], [facility], [address] [place] [address]  Location Phone: [phone]    Chief Complaint   Patient presents with    Ear Problem    Thyroid Problem       History of Present Illness  Candida Powell is a 63 y.o. female.  Candida Powell is here for follow up of ENT complaints. The patient is s/p myringotomy on 10/10/2023. She admits a huge improvement to hearing which is reflected on her hearing test today.   She also has a history of thyroid nodules. She denies fatigue, hoarseness, and dysphagia. She had a recent hospitalization in October. During this time, her TSH was elevated at 9.030. She has had this redrawn through her PCP this week but it is still pending.     Procedure visit with Kajal Castano AUD (2023)     Past Medical History:   Diagnosis Date    Arrhythmia     HL (hearing loss) 2023    Hypertension     Stroke     2018    SVT (supraventricular tachycardia)        Past Surgical History:   Procedure Laterality Date    CARDIAC ELECTROPHYSIOLOGY PROCEDURE N/A 2023    Procedure: Ablation SVT;  Surgeon: Arjun Welch MD;  Location: [facility] INVASIVE LOCATION;  Service: Cardiovascular;  Laterality: N/A;    NO PAST SURGERIES         Outpatient Medications Marked as Taking for the 23 encounter (Office Visit) with Sergo Dillard APRN   Medication Sig Dispense Refill    aspirin 81 MG tablet Take 1 tablet by mouth Daily.      carBAMazepine XR (TEGretol  XR) 100 MG 12 hr tablet Take 1 tablet by mouth Every 12 (Twelve) Hours. 60 tablet 5    fluticasone (Flonase Allergy Relief) 50 MCG/ACT nasal spray Instill 1 spray into the nostril(s) as directed by provider Daily. 16 g 0    Krill Oil Omega-3 500 MG capsule Take  by mouth Daily.      multivitamin with minerals tablet tablet Take 1 tablet by mouth Daily.      rivaroxaban (XARELTO) 20 MG tablet Take 1 tablet by mouth Daily. 30 tablet 0    rosuvastatin  (CRESTOR) 5 MG tablet Take 1 tablet by mouth every day at bedtime. 90 tablet 3    verapamil SR (CALAN-SR) 240 MG CR tablet Take 1 tablet by mouth Daily. 90 tablet 3    vitamin D (ERGOCALCIFEROL) 1.25 MG (82549 UT) capsule capsule Take 1 capsule by mouth once weekly. 4 capsule 5       Patient has no known allergies.    Family History   Problem Relation Age of Onset    Stroke Mother     Hypertension Father     Cancer Father        Social History     Socioeconomic History    Marital status:    Tobacco Use    Smoking status: Never    Smokeless tobacco: Never   Vaping Use    Vaping Use: Never used   Substance and Sexual Activity    Alcohol use: No    Drug use: Never    Sexual activity: Yes     Partners: Male       Review of Systems   Constitutional: Negative.    HENT: Negative.     Respiratory: Negative.     Cardiovascular: Negative.    Neurological: Negative.        Vitals:    12/06/23 1456   BP: 123/76   Pulse: 79   Resp: 16   Temp: 97.9 °F (36.6 °C)       Body mass index is 24.03 kg/m².    Objective     Physical Exam  Vitals reviewed.   Constitutional:       Appearance: Normal appearance. She is normal weight.   HENT:      Head: Normocephalic.      Right Ear: Tympanic membrane, ear canal and external ear normal.      Left Ear: Tympanic membrane, ear canal and external ear normal.      Nose: Nose normal.      Mouth/Throat:      Lips: Pink.      Mouth: Mucous membranes are moist.      Pharynx: Oropharynx is clear.   Neck:      Comments: Non palpable thyroid nodules  Lymphadenopathy:      Cervical: Cervical adenopathy present.   Neurological:      Mental Status: She is alert.   Psychiatric:         Behavior: Behavior is cooperative.         Assessment & Plan   Diagnoses and all orders for this visit:    1. Thyroid nodule (Primary)  -     US Head Neck Soft Tissue; Future    2. Lymphadenopathy  -     US Head Neck Soft Tissue; Future    3. History of myringotomy    4. Sensorineural hearing loss (SNHL) of both  ears      * Surgery not found *  Orders Placed This Encounter   Procedures    US Head Neck Soft Tissue     Standing Status:   Future     Standing Expiration Date:   2024     Order Specific Question:   Reason for Exam:     Answer:   thyroid nodule, lymphadenopathy     Order Specific Question:   Release to patient     Answer:   Routine Release [[id number]]     Thyroid ultrasound at follow up  Hearing test at follow up  Return for problems    Return in about 1 year (around 2024) for Recheck, with audio, with ultrasound.       Patient Instructions   Thyroid ultrasound at follow up  Hearing test at follow up  Return for problems    CONTACT INFORMATION:  The main office phone number is [phone]. For emergencies after hours and on weekends, this number will convert over to our answering service and the on call provider will answer. Please try to keep non emergent phone calls/ questions to office hours 9am-5pm Monday through Friday.      [address] [employer]  As an alternative, you can sign up and use the Epic MyChart system for more direct and quicker access for non emergent questions/ problems.  Noosh allows you to send messages to your doctor, view your test results, renew your prescriptions, schedule appointments, and more. To sign up, go to [employer] and click on the Sign Up Now link in the New User? box. Enter your [address] [employer] Activation Code exactly as it appears below along with the last four digits of your Social Security Number and your Date of Birth () to complete the sign-up process. If you do not sign up before the expiration date, you must request a new code.     [address] [facility] [employer] Activation Code: Activation code not generated  Current [address] [employer] Status: Active     If you have questions, you can email [email or web address] or call [phone] to talk to our [address] [employer] staff. Remember, [address] [facility] [employer] is NOT to be used for urgent needs. For medical emergencies, dial 911.     IF YOU SMOKE OR USE  TOBACCO PLEASE READ THE FOLLOWING:  Why is smoking bad for me?  Smoking increases the risk of heart disease, lung disease, vascular disease, stroke, and cancer. If you smoke, STOP!        IF YOU SMOKE OR USE TOBACCO PLEASE READ THE FOLLOWING:  Why is smoking bad for me?  Smoking increases the risk of heart disease, lung disease, vascular disease, stroke, and cancer. If you smoke, STOP!     For more information:  Quit Now [place]  1-800-QUIT-NOW  https://[email or web address].quitlogix.org/en-US/

## 2023-12-06 NOTE — PROGRESS NOTES
AUDIOMETRIC EVALUATION      Name:  Candida Powell  :  1960  Age:  63 y.o.  Date of Evaluation:  2023       History:  Ms. Powell is seen today at the request of SAMMY Smith for a follow-up hearing evaluation. Patient has a history of bilateral conductive hearing loss. Previous audio was on 2023.    Audiologic Information:  PETs: No  Other otologic surgical history: bilateral myringotamy on 10/10/2023  Aural Pressure/Fullness: No  Otalgia: No  Otorrhea: No  Tinnitus: No  Dizziness: No  Other significant history: stroke in 2018, hypertension    **Case history obtained in office and through EMR system      EVALUATION:        RESULTS:    Otoscopic Evaluation:  Right: clear canal, tympanic membrane visualized  Left: clear canal, tympanic membrane visualized    Tympanometry (226 Hz):  Right: Type A  Left: Type A    Pure Tone Audiometry:    Right: mild sensorineural hearing loss   Left: mild sensorineural hearing loss    Speech Audiometry:   Right: Speech Reception Threshold (SRT) was obtained at 30 dB HL  Word Recognition scores - Excellent (100)% at 70 dB with 50 dB of contralateral masking, using NU-6 List 3A with 10 words  Left: Speech Reception Threshold (SRT) was obtained at 30 dB HL  Word Recognition scores - Excellent (100)% at 70 dB with 50 dB of contralateral masking, using NU-6 List 3A with 10 words  SRT/PTA in good agreement bilaterally.    IMPRESSIONS:  Tympanometry showed normal middle ear pressure and static compliance, consistent with normal middle ear function for both ears. Pure tone thresholds for both ears show mild sensorineural hearing loss, suggesting normal outer/middle ear function and abnormal cochlear/retrocochlear function. Patient was counseled with regard to the findings.      Diagnosis:  1. Sensorineural hearing loss, bilateral         RECOMMENDATIONS/PLAN:  Follow-up recommendations per SAMMY Smith.  Practice good communication strategies to assist with  everyday listening (eye contact with speakers, reduce background noise, encourage others to communicate clearly and slowly).  Repeat hearing evaluation if changes in hearing are noted or concerns arise.  Discussed results and recommendations with patient. Questions were addressed and the patient was encouraged to contact our department should concerns arise.        Kajal Manriquez, CCC-A, F-AAA  Doctor of Audiology

## 2023-12-06 NOTE — PATIENT INSTRUCTIONS
Thyroid ultrasound at follow up  Hearing test at follow up  Return for problems    CONTACT INFORMATION:  The main office phone number is [phone]. For emergencies after hours and on weekends, this number will convert over to our answering service and the on call provider will answer. Please try to keep non emergent phone calls/ questions to office hours 9am-5pm Monday through Friday.      [employer]oyoo  As an alternative, you can sign up and use the Epic MyChart system for more direct and quicker access for non emergent questions/ problems.  [employer] allows you to send messages to your doctor, view your test results, renew your prescriptions, schedule appointments, and more. To sign up, go to [employer] and click on the Sign Up Now link in the New User? box. Enter your do[employer]oo Activation Code exactly as it appears below along with the last four digits of your Social Security Number and your Date of Birth () to complete the sign-up process. If you do not sign up before the expiration date, you must request a new code.     dooyoo Activation Code: Activation code not generated  Current dooyoo Status: Active     If you have questions, you can email [email or web address] or call [phone] to talk to our dooyoo staff. Remember, [employer]oo is NOT to be used for urgent needs. For medical emergencies, dial 911.     IF YOU SMOKE OR USE TOBACCO PLEASE READ THE FOLLOWING:  Why is smoking bad for me?  Smoking increases the risk of heart disease, lung disease, vascular disease, stroke, and cancer. If you smoke, STOP!        IF YOU SMOKE OR USE TOBACCO PLEASE READ THE FOLLOWING:  Why is smoking bad for me?  Smoking increases the risk of heart disease, lung disease, vascular disease, stroke, and cancer. If you smoke, STOP!     For more information:  Quit Now [facility]  -QUIT-NOW  [email or web address].[email or web address]/en-[email or web address]

## 2023-12-12 ENCOUNTER — OFFICE VISIT (OUTPATIENT)
Dept: CARDIOLOGY | Facility: CLINIC | Age: 63
End: 2023-12-12
Payer: COMMERCIAL

## 2023-12-12 VITALS
BODY MASS INDEX: 23.9 KG/M2 | HEIGHT: 64 IN | OXYGEN SATURATION: 100 % | WEIGHT: 140 LBS | SYSTOLIC BLOOD PRESSURE: 122 MMHG | HEART RATE: 67 BPM | DIASTOLIC BLOOD PRESSURE: 74 MMHG

## 2023-12-12 DIAGNOSIS — I48.3 TYPICAL ATRIAL FLUTTER: ICD-10-CM

## 2023-12-12 DIAGNOSIS — I47.10 SUSTAINED SVT: Primary | ICD-10-CM

## 2023-12-12 NOTE — PROGRESS NOTES
"[facility] -  CLINIC FOLLOW UP     Patient Care Team:  Fidel Hoskins MD as PCP - General (Sports Medicine)  Gabrielle Washington, MERNA as Ambulatory  (Population Health)    Chief Complaint: follow up to ablation     Subjective   EP Problems:  1.  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 Typical atrial flutter  -CTI flutter ablation 11/24/23    Cardiology Problems:  1.  Hypertension  2.  Hyperlipidemia     Medical Problems:  1.  Prior CVA  -2019     HPI: Today I had the pleasure of seeing Candida Powell in the cardiology clinic for follow up. She has a history of SVT dating back for some time more recently she has had recurrent bouts which failed to be terminating with vagal maneuvers requiring ER visits. She opted to have EPS and was found to have typical atrial flutter and AVNRT ablation.     She is on xarelto for now. CHADsVASc score would be 5 (age, female, CVA, HTN). She has not had any evidence of PAF, but has not worn a monitor since 2019. Since her ablation, she has done well without any recurrent tachycardia. Occasionally, she has had some \"hard beats\" but nothing sustained. She denies any chest discomfort or progressive shortness of breath.     Objective     Visit Vitals  /74   Pulse 67   Ht 162.6 cm (64.02\")   Wt 63.5 kg (140 lb)   SpO2 100%   BMI 24.02 kg/m²           Vitals reviewed.   Constitutional:       Appearance: Healthy appearance. Not in distress.   Eyes:      Extraocular Movements: Extraocular movements intact.      Conjunctiva/sclera: Conjunctivae normal.      Pupils: Pupils are equal, round, and reactive to light.   HENT:      Head: Normocephalic and atraumatic.      Nose: Nose normal.    Mouth/Throat:      Lips: Pink.      Mouth: Mucous membranes are moist.      Pharynx: Oropharynx is clear.   Neck:      Vascular: No carotid bruit or JVD. JVD normal.   Pulmonary:      Effort: Pulmonary effort is normal.   Chest:      Chest wall: Not tender to " palpatation.   Edema:     Peripheral edema absent.   Abdominal:      Palpations: Abdomen is soft.   Musculoskeletal: Normal range of motion.      Extremities: No clubbing present.     Cervical back: Normal range of motion. Skin:     General: Skin is warm and dry.   Neurological:      General: No focal deficit present.      Mental Status: Alert and oriented to person, place, and time.   Psychiatric:         Attention and Perception: Attention normal.         Mood and Affect: Affect normal.         Speech: Speech normal.         Behavior: Behavior normal.         Cognition and Memory: Cognition normal.             The following portions of the patient's history were reviewed and updated as appropriate: allergies, current medications, past medical history, past social history, past and problem list.     Review of Systems   Constitutional: Negative.    HENT: Negative.     Eyes: Negative.    Respiratory: Negative.     Cardiovascular: Negative.    Gastrointestinal: Negative.    Endocrine: Negative.    Genitourinary: Negative.    Musculoskeletal: Negative.    Skin: Negative.    Allergic/Immunologic: Negative.    Neurological: Negative.    Hematological: Negative.    Psychiatric/Behavioral: Negative.                ECG 12 Lead    Date/Time: 12/12/2023 2:35 PM  Performed by: Elizabeth Maki PA    Authorized by: Elizabeth Maki PA  Comparison: compared with previous ECG from 11/16/2023  Rhythm: sinus rhythm  Rate: normal            Medication Review: yes    Current Outpatient Medications:     carBAMazepine XR (TEGretol  XR) 100 MG 12 hr tablet, Take 1 tablet by mouth Every 12 (Twelve) Hours., Disp: 60 tablet, Rfl: 5    fluticasone (Flonase Allergy Relief) 50 MCG/ACT nasal spray, Instill 1 spray into the nostril(s) as directed by provider Daily., Disp: 16 g, Rfl: 0    Krill Oil Omega-3 500 MG capsule, Take  by mouth Daily., Disp: , Rfl:     multivitamin with minerals tablet tablet, Take 1 tablet by mouth Daily., Disp: ,  "Rfl:     rosuvastatin (CRESTOR) 5 MG tablet, Take 1 tablet by mouth every day at bedtime., Disp: 90 tablet, Rfl: 3    verapamil SR (CALAN-SR) 240 MG CR tablet, Take 1 tablet by mouth Daily., Disp: 90 tablet, Rfl: 3    vitamin D (ERGOCALCIFEROL) 1.25 MG (62427 UT) capsule capsule, Take 1 capsule by mouth once weekly., Disp: 4 capsule, Rfl: 5    rivaroxaban (XARELTO) 20 MG tablet, Take 1 tablet by mouth Daily., Disp: 30 tablet, Rfl: 0   No Known Allergies    I have reviewed       CBC:  Lab Results - Last 18 Months   Lab Units 11/22/23  0710   WBC 10*3/mm3 6.51   HEMOGLOBIN g/dL 13.8   HEMATOCRIT % 44.1   PLATELETS 10*3/mm3 369      BMP/CMP:  Lab Results - Last 18 Months   Lab Units 11/22/23  0710   SODIUM mmol/L 141   POTASSIUM mmol/L 4.2   CHLORIDE mmol/L 104   CO2 mmol/L 30.0*   GLUCOSE mg/dL 104*   BUN mg/dL 14   CREATININE mg/dL 0.72   CALCIUM mg/dL 8.8     BNP: No results for input(s): \"PROBNP\" in the last 80801 hours.   THYROID:  Lab Results - Last 18 Months   Lab Units 10/31/23  1808   TSH uIU/mL 9.030*   FREE T4 ng/dL 0.81*       Results for orders placed during the hospital encounter of 07/15/19    Adult Transthoracic Echo Complete W/ Cont if Necessary Per Protocol    Interpretation Summary  · Left ventricular systolic function is normal. Estimated EF appears to be in the range of 61 - 65%.  · No evidence of a patent foramen ovale.  · Trace-to-mild mitral valve regurgitation is present.     Assessment:   Diagnoses and all orders for this visit:    1. Sustained SVT (Primary)  -     ECG 12 Lead; Future  -     ECG 12 Lead    2. Typical atrial flutter  -     rivaroxaban (XARELTO) 20 MG tablet; Take 1 tablet by mouth Daily.  Dispense: 30 tablet; Refill: 0      AVNRT: s/p successful ablation. We reviewed the procedure and the findings and provided reassurance that this is usually very curative with ablation.     Typical atrial flutter: s/p CTI ablation. She is on xarelto. Discussed that her prior CVA could have " been related to atrial flutter. She does not have a lot of CV risk factors and did not have PFO or findings on her holter monitor in 2019.    -Discussed her high CHADsVASc score of 5 and since she hasn't had evidence of afib, it would be reasonable to do a monitor to assess for afib.   -Will continue xarelto for now. (Does have interaction with carbamazepine).  -Follow up as scheduled in MD clinic.     CVA: She may hold her aspirin while she is on xarelto. Continue statin for secondary prevention. If Dr. Welch discontinues xarelto, then she is to restart aspirin 81mg daily.     I spent 30 minutes caring for Candida on this date of service. This time includes time spent by me in the following activities:preparing for the visit, reviewing tests, obtaining and/or reviewing a separately obtained history, performing a medically appropriate examination and/or evaluation , counseling and educating the patient/family/caregiver, ordering medications, tests, or procedures, referring and communicating with other health care professionals , documenting information in the medical record, and independently interpreting results and communicating that information with the patient/family/caregiver        Electronically signed by AIXA Moore

## 2024-01-01 DIAGNOSIS — I48.3 TYPICAL ATRIAL FLUTTER: ICD-10-CM

## 2024-01-03 DIAGNOSIS — E55.9 VITAMIN D DEFICIENCY, UNSPECIFIED: ICD-10-CM

## 2024-01-03 RX ORDER — ERGOCALCIFEROL 1.25 MG/1
50000 CAPSULE ORAL WEEKLY
Qty: 4 CAPSULE | Refills: 5 | Status: SHIPPED | OUTPATIENT
Start: 2024-01-03

## 2024-02-06 ENCOUNTER — OFFICE VISIT (OUTPATIENT)
Dept: CARDIOLOGY | Facility: CLINIC | Age: 64
End: 2024-02-06
Payer: COMMERCIAL

## 2024-02-06 VITALS
HEART RATE: 80 BPM | DIASTOLIC BLOOD PRESSURE: 82 MMHG | OXYGEN SATURATION: 99 % | WEIGHT: 141 LBS | HEIGHT: 64 IN | SYSTOLIC BLOOD PRESSURE: 122 MMHG | BODY MASS INDEX: 24.07 KG/M2

## 2024-02-06 DIAGNOSIS — I48.3 TYPICAL ATRIAL FLUTTER: Primary | ICD-10-CM

## 2024-02-06 DIAGNOSIS — I47.19 AVNRT (AV NODAL RE-ENTRY TACHYCARDIA): ICD-10-CM

## 2024-02-06 NOTE — PROGRESS NOTES
"[facility] -  CLINIC FOLLOW UP     Patient Care Team:  Fidel Hoskins MD as PCP - General (Sports Medicine)  Arjun Welch MD as Cardiologist (Cardiac Electrophysiology)  Elizabeth Maki PA as Physician Assistant (Physician Assistant)    Chief Complaint: follow up to ablation    Subjective   EP Problems:  1.  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 Typical atrial flutter  -CTI flutter ablation 11/24/23    Cardiology Problems:  1.  Hypertension  2.  Hyperlipidemia     Medical Problems:  1.  Prior CVA  -2019     HPI: Today I had the pleasure of seeing Candida Powell in the cardiology clinic for follow up. She is a 63 year old female who has a history of SVT dating back for some time more recently she has had recurrent bouts which failed to be terminating with vagal maneuvers quiring ER visits. She opted to undergo ablation and had AVNRT and typical atrial flutter performed. She has recovered well from her ablation and denies any recurrences.     She denies any bleeding issues, [id number] score is elevated at 4.     Objective     Visit Vitals  /82   Pulse 80   Ht 162.6 cm (64.02\")   Wt 64 kg (141 lb)   SpO2 99%   BMI 24.19 kg/m²           Vitals reviewed.   Constitutional:       Appearance: Healthy appearance. Not in distress.   Eyes:      Extraocular Movements: Extraocular movements intact.      Conjunctiva/sclera: Conjunctivae normal.      Pupils: Pupils are equal, round, and reactive to light.   HENT:      Head: Normocephalic and atraumatic.      Nose: Nose normal.    Mouth/Throat:      Lips: Pink.      Mouth: Mucous membranes are moist.      Pharynx: Oropharynx is clear.   Neck:      Vascular: No carotid bruit or JVD. JVD normal.   Pulmonary:      Effort: Pulmonary effort is normal.      Breath sounds: Normal breath sounds.   Chest:      Chest wall: Not tender to palpatation.   Cardiovascular:      PMI at left midclavicular line. Normal rate. Regular rhythm. Normal S1. " Normal S2.       Murmurs: There is no murmur.      No gallop.  No rub.   Pulses:     Radial: 2+ bilaterally.     Posterior tibial: 2+ bilaterally.  Edema:     Peripheral edema absent.   Abdominal:      General: Bowel sounds are normal.      Palpations: Abdomen is soft.   Musculoskeletal: Normal range of motion.      Extremities: No clubbing present.     Cervical back: Normal range of motion. Skin:     General: Skin is warm and dry.   Neurological:      General: No focal deficit present.      Mental Status: Alert and oriented to person, place, and time.   Psychiatric:         Attention and Perception: Attention normal.         Mood and Affect: Affect normal.         Speech: Speech normal.         Behavior: Behavior normal.         Cognition and Memory: Cognition normal.             The following portions of the patient's history were reviewed and updated as appropriate: allergies, current medications, past medical history, past social history, past and problem list.     Review of Systems   Constitutional: Negative.    HENT: Negative.     Eyes: Negative.    Respiratory: Negative.     Cardiovascular: Negative.    Gastrointestinal: Negative.    Endocrine: Negative.    Genitourinary: Negative.    Musculoskeletal: Negative.    Skin: Negative.    Allergic/Immunologic: Negative.    Neurological: Negative.    Hematological: Negative.    Psychiatric/Behavioral: Negative.           ECG 12 Lead    Date/Time: 2/6/2024 11:46 AM  Performed by: Elizabeth Maki PA    Authorized by: Arjun Welch MD  Comparison: compared with previous ECG from 11/16/2023  Rhythm: sinus rhythm  Rate: normal    Clinical impression: normal ECG            Medication Review: yes    Current Outpatient Medications:     carBAMazepine XR (TEGretol  XR) 100 MG 12 hr tablet, Take 1 tablet by mouth Every 12 (Twelve) Hours., Disp: 60 tablet, Rfl: 5    fluticasone (Flonase Allergy Relief) 50 MCG/ACT nasal spray, Instill 1 spray into the nostril(s) as directed by  "provider Daily., Disp: 16 g, Rfl: 0    Krill Oil Omega-3 500 MG capsule, Take  by mouth Daily., Disp: , Rfl:     multivitamin with minerals tablet tablet, Take 1 tablet by mouth Daily., Disp: , Rfl:     rivaroxaban (Xarelto) 20 MG tablet, Take 1 tablet by mouth Daily., Disp: 30 tablet, Rfl: 0    rosuvastatin (CRESTOR) 5 MG tablet, Take 1 tablet by mouth every day at bedtime., Disp: 90 tablet, Rfl: 3    verapamil SR (CALAN-SR) 240 MG CR tablet, Take 1 tablet by mouth Daily., Disp: 90 tablet, Rfl: 3    vitamin D (ERGOCALCIFEROL) 1.25 MG (04867 UT) capsule capsule, Take 1 capsule by mouth once weekly., Disp: 4 capsule, Rfl: 5   No Known Allergies    I have reviewed       CBC:  Lab Results - Last 18 Months   Lab Units 11/22/23  0710   WBC 10*3/mm3 6.51   HEMOGLOBIN g/dL 13.8   HEMATOCRIT % 44.1   PLATELETS 10*3/mm3 369      BMP/CMP:  Lab Results - Last 18 Months   Lab Units 11/22/23  0710   SODIUM mmol/L 141   POTASSIUM mmol/L 4.2   CHLORIDE mmol/L 104   CO2 mmol/L 30.0*   GLUCOSE mg/dL 104*   BUN mg/dL 14   CREATININE mg/dL 0.72   CALCIUM mg/dL 8.8     BNP: No results for input(s): \"PROBNP\" in the last 59318 hours.   THYROID:  Lab Results - Last 18 Months   Lab Units 10/31/23  1808   TSH uIU/mL 9.030*   FREE T4 ng/dL 0.81*       Results for orders placed during the hospital encounter of 07/15/19    Adult Transthoracic Echo Complete W/ Cont if Necessary Per Protocol    Interpretation Summary  · Left ventricular systolic function is normal. Estimated EF appears to be in the range of 61 - 65%.  · No evidence of a patent foramen ovale.  · Trace-to-mild mitral valve regurgitation is present.     Assessment:   Diagnoses and all orders for this visit:    1. Typical atrial flutter (Primary)  -     ECG 12 Lead; Future    2. AVNRT (AV dangelo re-entry tachycardia)      AVNRT s/p successful ablation and typical atrial flutter s/p CTI ablation: Doing well without recurrence. Discussed that given her previous stroke, anticoagulation " is recommended indefinitely with elevated [id number] score.   -Follow up in 6 months     I spent 30 minutes caring for Candida on this date of service. This time includes time spent by me in the following activities:preparing for the visit, reviewing tests, obtaining and/or reviewing a separately obtained history, performing a medically appropriate examination and/or evaluation , counseling and educating the patient/family/caregiver, ordering medications, tests, or procedures, referring and communicating with other health care professionals , documenting information in the medical record, and independently interpreting results and communicating that information with the patient/family/caregiver        Electronically signed by AIXA Moore

## 2024-02-15 DIAGNOSIS — I48.3 TYPICAL ATRIAL FLUTTER: ICD-10-CM

## 2024-02-21 ENCOUNTER — TELEPHONE (OUTPATIENT)
Dept: VASCULAR SURGERY | Facility: CLINIC | Age: 64
End: 2024-02-21
Payer: COMMERCIAL

## 2024-02-22 ENCOUNTER — HOSPITAL ENCOUNTER (OUTPATIENT)
Dept: ULTRASOUND IMAGING | Facility: HOSPITAL | Age: 64
Discharge: HOME | End: 2024-02-22
Admitting: NURSE PRACTITIONER
Payer: COMMERCIAL

## 2024-02-22 ENCOUNTER — OFFICE VISIT (OUTPATIENT)
Dept: VASCULAR SURGERY | Facility: CLINIC | Age: 64
End: 2024-02-22
Payer: COMMERCIAL

## 2024-02-22 VITALS
DIASTOLIC BLOOD PRESSURE: 80 MMHG | BODY MASS INDEX: 23.56 KG/M2 | SYSTOLIC BLOOD PRESSURE: 126 MMHG | OXYGEN SATURATION: 98 % | WEIGHT: 138 LBS | HEART RATE: 78 BPM | HEIGHT: 64 IN

## 2024-02-22 DIAGNOSIS — I65.23 BILATERAL CAROTID ARTERY STENOSIS: Primary | ICD-10-CM

## 2024-02-22 DIAGNOSIS — I10 ESSENTIAL HYPERTENSION: ICD-10-CM

## 2024-02-22 DIAGNOSIS — I65.23 BILATERAL CAROTID ARTERY STENOSIS: ICD-10-CM

## 2024-02-22 DIAGNOSIS — E78.2 MIXED HYPERLIPIDEMIA: ICD-10-CM

## 2024-02-22 PROCEDURE — 93880 EXTRACRANIAL BILAT STUDY: CPT

## 2024-02-22 PROCEDURE — 99214 OFFICE O/P EST MOD 30 MIN: CPT | Performed by: NURSE PRACTITIONER

## 2024-02-22 NOTE — LETTER
"February 22, 2024     Fidel Hoskins MD  [address] [place] [address]    Patient: Candida Powell   YOB: 1960   Date of Visit: 2/22/2024     Dear Fidel Hoskins MD:       Thank you for referring Candida Powell to me for evaluation. Below are the relevant portions of my assessment and plan of care.    If you have questions, please do not hesitate to call me. I look forward to following Candida along with you.         Sincerely,        Myrna SAMMY Gamez        CC: No Recipients    Myrna Gamez APRN  02/22/24 0831  Sign when Signing Visit  2/22/2024        Fidel Hoskins MD  [address] [place] [address]      Candida Powell  1960    Chief Complaint   Patient presents with   • Follow-up     1 year follow up w/ testing. Last seen 3/9/23. Patient denies stroke type symptoms.        Dear Fidel Hoskins MD        HPI  I had the pleasure of seeing your patient Candida Powell in the office today.    As you recall, Candida Powell is a 63 y.o.  female who you are following for routine health maintenance.  Currently she is doing well and denies any strokelike symptoms.  She is maintained on Xarelto and Crestor.  She did have noninvasive testing performed today, which I did review in office.       Review of Systems   Constitutional: Negative.    HENT: Negative.     Eyes: Negative.    Respiratory: Negative.     Cardiovascular: Negative.    Gastrointestinal: Negative.    Endocrine: Negative.    Genitourinary: Negative.    Musculoskeletal: Negative.    Skin: Negative.    Allergic/Immunologic: Negative.    Neurological: Negative.    Hematological: Negative.    Psychiatric/Behavioral: Negative.           /80   Pulse 78   Ht 162.6 cm (64\")   Wt 62.6 kg (138 lb)   SpO2 98%   BMI 23.69 kg/m²    Physical Exam  Vitals and nursing note reviewed.   Constitutional:       General: She is not in acute distress.     Appearance: Normal appearance. She is " well-developed and normal weight. She is not diaphoretic.   HENT:      Head: Normocephalic and atraumatic.   Eyes:      General: No scleral icterus.     Pupils: Pupils are equal, round, and reactive to light.   Neck:      Thyroid: No thyromegaly.      Vascular: No carotid bruit or JVD.   Cardiovascular:      Rate and Rhythm: Normal rate and regular rhythm.      Pulses: Normal pulses.      Heart sounds: Normal heart sounds and S2 normal. No murmur heard.     No friction rub. No gallop.   Pulmonary:      Effort: Pulmonary effort is normal.      Breath sounds: Normal breath sounds.   Abdominal:      General: Bowel sounds are normal.      Palpations: Abdomen is soft.   Musculoskeletal:         General: Normal range of motion.      Cervical back: Normal range of motion and neck supple.   Skin:     General: Skin is warm and dry.   Neurological:      General: No focal deficit present.      Mental Status: She is alert and oriented to person, place, and time.      Cranial Nerves: No cranial nerve deficit.   Psychiatric:         Behavior: Behavior normal.         Thought Content: Thought content normal.         Judgment: Judgment normal.        Diagnostic data:  Noninvasive testing including a carotid duplex shows less than 50% carotid stenosis bilaterally.      Patient Active Problem List   Diagnosis   • Brainstem stroke   • Mixed hyperlipidemia   • Cardiac arrhythmia   • Hypertension   • Atypical facial pain   • Sustained SVT   • AVNRT (AV dangelo re-entry tachycardia)   • Typical atrial flutter         ICD-10-CM ICD-9-CM   1. Bilateral carotid artery stenosis  I65.23 433.10     433.30   2. Mixed hyperlipidemia  E78.2 272.2   3. Essential hypertension  I10 401.9         Plan: After thoroughly evaluating Candida Powell, I believe the best course of action is to remain conservative from vascular surgery standpoint.  Currently she is doing well and denies any strokelike symptoms.  I did review her testing which shows less  than 50% carotid stenosis bilaterally with bilateral antegrade vertebral flow.  We will see her back in 1 year with repeat noninvasive testing including a carotid duplex.  I did discuss vascular risk factors as they pertain to the progression of vascular disease including controlling her hypertension and hyperlipidemia.  Her blood pressure stable on her current medications.  She should continue on her Xarelto 20 mg daily and Crestor grams daily in addition to her other medications.   This was all discussed in full with complete understanding.    Thank you for allowing me to participate in the care of your patient.  Please do not hesitate with any questions or concerns.  I will keep you aware of any further encounters with Candida Powell.        Sincerely yours,         SAMMY Mondragon

## 2024-02-22 NOTE — PROGRESS NOTES
"2/22/2024        Fidel Hoskins MD  [address] [facility] [place] [address]      Candida Powell  1960    Chief Complaint   Patient presents with    Follow-up     1 year follow up w/ testing. Last seen 3/9/23. Patient denies stroke type symptoms.        Dear Fidel Hoskins MD        HPI  I had the pleasure of seeing your patient Candida Powell in the office today.    As you recall, Candida Powell is a 63 y.o.  female who you are following for routine health maintenance.  Currently she is doing well and denies any strokelike symptoms.  She is maintained on Xarelto and Crestor.  She did have noninvasive testing performed today, which I did review in office.       Review of Systems   Constitutional: Negative.    HENT: Negative.     Eyes: Negative.    Respiratory: Negative.     Cardiovascular: Negative.    Gastrointestinal: Negative.    Endocrine: Negative.    Genitourinary: Negative.    Musculoskeletal: Negative.    Skin: Negative.    Allergic/Immunologic: Negative.    Neurological: Negative.    Hematological: Negative.    Psychiatric/Behavioral: Negative.           /80   Pulse 78   Ht 162.6 cm (64\")   Wt 62.6 kg (138 lb)   SpO2 98%   BMI 23.69 kg/m²    Physical Exam  Vitals and nursing note reviewed.   Constitutional:       General: She is not in acute distress.     Appearance: Normal appearance. She is well-developed and normal weight. She is not diaphoretic.   HENT:      Head: Normocephalic and atraumatic.   Eyes:      General: No scleral icterus.     Pupils: Pupils are equal, round, and reactive to light.   Neck:      Thyroid: No thyromegaly.      Vascular: No carotid bruit or JVD.   Cardiovascular:      Rate and Rhythm: Normal rate and regular rhythm.      Pulses: Normal pulses.      Heart sounds: Normal heart sounds and S2 normal. No murmur heard.     No friction rub. No gallop.   Pulmonary:      Effort: Pulmonary effort is normal.      Breath sounds: Normal breath sounds. "   Abdominal:      General: Bowel sounds are normal.      Palpations: Abdomen is soft.   Musculoskeletal:         General: Normal range of motion.      Cervical back: Normal range of motion and neck supple.   Skin:     General: Skin is warm and dry.   Neurological:      General: No focal deficit present.      Mental Status: She is alert and oriented to person, place, and time.      Cranial Nerves: No cranial nerve deficit.   Psychiatric:         Behavior: Behavior normal.         Thought Content: Thought content normal.         Judgment: Judgment normal.        Diagnostic data:  Noninvasive testing including a carotid duplex shows less than 50% carotid stenosis bilaterally.      Patient Active Problem List   Diagnosis    Brainstem stroke    Mixed hyperlipidemia    Cardiac arrhythmia    Hypertension    Atypical facial pain    Sustained SVT    AVNRT (AV dangelo re-entry tachycardia)    Typical atrial flutter         ICD-10-CM ICD-9-CM   1. Bilateral carotid artery stenosis  I65.23 433.10     433.30   2. Mixed hyperlipidemia  E78.2 272.2   3. Essential hypertension  I10 401.9         Plan: After thoroughly evaluating Candida Powell, I believe the best course of action is to remain conservative from vascular surgery standpoint.  Currently she is doing well and denies any strokelike symptoms.  I did review her testing which shows less than 50% carotid stenosis bilaterally with bilateral antegrade vertebral flow.  We will see her back in 1 year with repeat noninvasive testing including a carotid duplex.  I did discuss vascular risk factors as they pertain to the progression of vascular disease including controlling her hypertension and hyperlipidemia.  Her blood pressure stable on her current medications.  She should continue on her Xarelto 20 mg daily and Crestor grams daily in addition to her other medications.   This was all discussed in full with complete understanding.    Thank you for allowing me to participate in  the care of your patient.  Please do not hesitate with any questions or concerns.  I will keep you aware of any further encounters with Candida Powell.        Sincerely yours,         SAMMY Mondragon

## 2024-08-08 ENCOUNTER — OFFICE VISIT (OUTPATIENT)
Dept: CARDIOLOGY | Facility: CLINIC | Age: 64
End: 2024-08-08
Payer: COMMERCIAL

## 2024-08-08 VITALS
OXYGEN SATURATION: 99 % | DIASTOLIC BLOOD PRESSURE: 86 MMHG | BODY MASS INDEX: 23.22 KG/M2 | HEIGHT: 64 IN | WEIGHT: 136 LBS | SYSTOLIC BLOOD PRESSURE: 132 MMHG | HEART RATE: 77 BPM

## 2024-08-08 DIAGNOSIS — I47.10 PAROXYSMAL SVT (SUPRAVENTRICULAR TACHYCARDIA): Primary | ICD-10-CM

## 2024-08-08 DIAGNOSIS — I63.9 BRAINSTEM STROKE: ICD-10-CM

## 2024-08-08 DIAGNOSIS — I48.3 TYPICAL ATRIAL FLUTTER: ICD-10-CM

## 2024-08-08 DIAGNOSIS — I47.19 AVNRT (AV NODAL RE-ENTRY TACHYCARDIA): ICD-10-CM

## 2024-08-08 NOTE — PROGRESS NOTES
"[facility] -  CLINIC FOLLOW UP     Patient Care Team:  Fidel Hoskins MD as PCP - General (Sports Medicine)  Arjun Welch MD as Cardiologist (Cardiac Electrophysiology)  Elizabeth Maki PA as Physician Assistant (Physician Assistant)    Chief Complaint: no complaints     Subjective   EP Problems:  1.  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 Typical atrial flutter  -CTI flutter ablation 11/24/23    Cardiology Problems:  1.  Hypertension  2.  Hyperlipidemia     Medical Problems:  1.  Prior CVA  -2019       HPI: Today I had the pleasure of seeing Candida Powell in the cardiology clinic for follow up.  She is a 63 year old female who has a history of SVT dating back for some time more recently she has had recurrent bouts which failed to be terminating with vagal maneuvers quiring ER visits. She opted to undergo ablation and had AVNRT and typical atrial flutter performed.     In 2019, she developed left sided numbness and pain. This prompted an MRI and it showed a lateral aspect of the medulla on the right side likely representing an area of old ischemia/infarct. She states her palpitations have resolved and she doesn't have the sensation of \"hard beats\" either anymore. She denies any chest pain or tightness, presyncope or progressive SOB.     Objective     Visit Vitals  /86   Pulse 77   Ht 162.6 cm (64.02\")   Wt 61.7 kg (136 lb)   SpO2 99%   BMI 23.33 kg/m²           Vitals reviewed.   Constitutional:       Appearance: Healthy appearance. Not in distress.   Eyes:      Extraocular Movements: Extraocular movements intact.      Conjunctiva/sclera: Conjunctivae normal.      Pupils: Pupils are equal, round, and reactive to light.   HENT:      Head: Normocephalic and atraumatic.      Nose: Nose normal.    Mouth/Throat:      Lips: Pink.      Mouth: Mucous membranes are moist.      Pharynx: Oropharynx is clear.   Neck:      Vascular: No carotid bruit or JVD. JVD normal. "   Pulmonary:      Effort: Pulmonary effort is normal.      Breath sounds: Normal breath sounds.   Chest:      Chest wall: Not tender to palpatation.   Cardiovascular:      PMI at left midclavicular line. Normal rate. Regular rhythm. Normal S1. Normal S2.       Murmurs: There is no murmur.      No gallop.  No rub.   Pulses:     Radial: 2+ bilaterally.  Edema:     Peripheral edema absent.   Abdominal:      General: Bowel sounds are normal.      Palpations: Abdomen is soft.   Musculoskeletal: Normal range of motion.      Extremities: No clubbing present.     Cervical back: Normal range of motion. Skin:     General: Skin is warm and dry.   Neurological:      General: No focal deficit present.      Mental Status: Alert and oriented to person, place, and time.   Psychiatric:         Attention and Perception: Attention normal.         Mood and Affect: Affect normal.         Speech: Speech normal.         Behavior: Behavior normal.         Cognition and Memory: Cognition normal.             The following portions of the patient's history were reviewed and updated as appropriate: allergies, current medications, past medical history, past social history, past and problem list.     Review of Systems   Constitutional: Negative.    HENT: Negative.     Eyes: Negative.    Respiratory: Negative.     Cardiovascular: Negative.    Gastrointestinal: Negative.    Endocrine: Negative.    Genitourinary: Negative.    Musculoskeletal: Negative.    Skin: Negative.    Allergic/Immunologic: Negative.    Neurological: Negative.    Hematological: Negative.    Psychiatric/Behavioral: Negative.                ECG 12 Lead    Date/Time: 8/9/2024 3:44 PM  Performed by: Elizabeth Maki PA    Authorized by: Elizabeth Maki PA  Comparison: compared with previous ECG from 2/6/2024  Rhythm: sinus rhythm  Rate: normal  QRS axis: normal            Medication Review: yes    Current Outpatient Medications:     carBAMazepine XR (TEGretol  XR) 100 MG 12 hr  "tablet, Take 1 tablet by mouth Every 12 (Twelve) Hours., Disp: 60 tablet, Rfl: 5    fluticasone (Flonase Allergy Relief) 50 MCG/ACT nasal spray, Instill 1 spray into the nostril(s) as directed by provider Daily., Disp: 16 g, Rfl: 0    Krill Oil Omega-3 500 MG capsule, Take  by mouth Daily., Disp: , Rfl:     multivitamin with minerals tablet tablet, Take 1 tablet by mouth Daily., Disp: , Rfl:     rivaroxaban (Xarelto) 20 MG tablet, Take 1 tablet by mouth Daily., Disp: 30 tablet, Rfl: 11    rosuvastatin (CRESTOR) 5 MG tablet, Take 1 tablet by mouth every day at bedtime., Disp: 90 tablet, Rfl: 3    verapamil SR (CALAN-SR) 240 MG CR tablet, Take 1 tablet by mouth Daily., Disp: 90 tablet, Rfl: 3    vitamin D (ERGOCALCIFEROL) 1.25 MG (24081 UT) capsule capsule, Take 1 capsule by mouth once weekly., Disp: 4 capsule, Rfl: 5   No Known Allergies    I have reviewed       CBC:  Lab Results - Last 18 Months   Lab Units 11/22/23  0710   WBC 10*3/mm3 6.51   HEMOGLOBIN g/dL 13.8   HEMATOCRIT % 44.1   PLATELETS 10*3/mm3 369      BMP/CMP:  Lab Results - Last 18 Months   Lab Units 11/22/23  0710   SODIUM mmol/L 141   POTASSIUM mmol/L 4.2   CHLORIDE mmol/L 104   CO2 mmol/L 30.0*   GLUCOSE mg/dL 104*   BUN mg/dL 14   CREATININE mg/dL 0.72   CALCIUM mg/dL 8.8     BNP: No results for input(s): \"PROBNP\" in the last 23817 hours.   THYROID:  Lab Results - Last 18 Months   Lab Units 10/31/23  1808   TSH uIU/mL 9.030*   FREE T4 ng/dL 0.81*       Results for orders placed during the hospital encounter of 07/15/19    Adult Transthoracic Echo Complete W/ Cont if Necessary Per Protocol    Interpretation Summary  · Left ventricular systolic function is normal. Estimated EF appears to be in the range of 61 - 65%.  · No evidence of a patent foramen ovale.  · Trace-to-mild mitral valve regurgitation is present.     Assessment:   Diagnoses and all orders for this visit:    1. Paroxysmal SVT (supraventricular tachycardia) (Primary)  -     ECG 12 Lead; " "Future    2. AVNRT (AV dangelo re-entry tachycardia)    3. Typical atrial flutter    4. Brainstem stroke    Other orders  -     ECG 12 Lead      Atrial flutter, typical: c/p CTI flutter ablation. Discussed that she has not had any evidence of afib to date, however, given her [id number] score that is 4, (age, gender, CVA) anticoagulation is not unreasonable to continue. There is no guarantee that she would not develop afib in life. She is not opposed to continuing anticoagulation and agrees that it is like an \"insurance policy\". She denies any bleeding issues or development of anemia.   -Follow up in 6 months. Can consider a monitor and assess for PAF if she wants to stop anticoagulation at that point.     AVNRT: S/p ablation. Reassured her that ablation is quite curative for this particular arrhythmia and would not expect her to have recurrence.     I spent 30 minutes caring for Candida on this date of service. This time includes time spent by me in the following activities:preparing for the visit, reviewing tests, obtaining and/or reviewing a separately obtained history, performing a medically appropriate examination and/or evaluation , counseling and educating the patient/family/caregiver, ordering medications, tests, or procedures, referring and communicating with other health care professionals , documenting information in the medical record, and independently interpreting results and communicating that information with the patient/family/caregiver        Electronically signed by AIXA Moore    "

## 2025-02-10 ENCOUNTER — TELEPHONE (OUTPATIENT)
Dept: VASCULAR SURGERY | Facility: CLINIC | Age: 65
End: 2025-02-10
Payer: COMMERCIAL

## 2025-02-11 ENCOUNTER — OFFICE VISIT (OUTPATIENT)
Dept: CARDIOLOGY | Facility: CLINIC | Age: 65
End: 2025-02-11
Payer: MEDICARE

## 2025-02-11 ENCOUNTER — HOSPITAL ENCOUNTER (OUTPATIENT)
Dept: ULTRASOUND IMAGING | Facility: HOSPITAL | Age: 65
Discharge: HOME | End: 2025-02-11
Admitting: NURSE PRACTITIONER
Payer: COMMERCIAL

## 2025-02-11 ENCOUNTER — PATIENT ROUNDING (BHMG ONLY) (OUTPATIENT)
Dept: VASCULAR SURGERY | Facility: CLINIC | Age: 65
End: 2025-02-11
Payer: COMMERCIAL

## 2025-02-11 ENCOUNTER — OFFICE VISIT (OUTPATIENT)
Dept: VASCULAR SURGERY | Facility: CLINIC | Age: 65
End: 2025-02-11
Payer: MEDICARE

## 2025-02-11 VITALS
OXYGEN SATURATION: 97 % | WEIGHT: 136.6 LBS | SYSTOLIC BLOOD PRESSURE: 122 MMHG | HEIGHT: 64 IN | HEART RATE: 96 BPM | DIASTOLIC BLOOD PRESSURE: 68 MMHG | BODY MASS INDEX: 23.32 KG/M2

## 2025-02-11 VITALS
WEIGHT: 135 LBS | SYSTOLIC BLOOD PRESSURE: 136 MMHG | HEART RATE: 65 BPM | BODY MASS INDEX: 23.05 KG/M2 | HEIGHT: 64 IN | DIASTOLIC BLOOD PRESSURE: 72 MMHG

## 2025-02-11 DIAGNOSIS — I48.3 TYPICAL ATRIAL FLUTTER: ICD-10-CM

## 2025-02-11 DIAGNOSIS — I65.23 BILATERAL CAROTID ARTERY STENOSIS: ICD-10-CM

## 2025-02-11 DIAGNOSIS — R00.2 PALPITATIONS: ICD-10-CM

## 2025-02-11 DIAGNOSIS — E78.2 MIXED HYPERLIPIDEMIA: ICD-10-CM

## 2025-02-11 DIAGNOSIS — I65.23 BILATERAL CAROTID ARTERY STENOSIS: Primary | ICD-10-CM

## 2025-02-11 DIAGNOSIS — I47.19 AVNRT (AV NODAL RE-ENTRY TACHYCARDIA): Primary | ICD-10-CM

## 2025-02-11 DIAGNOSIS — I10 ESSENTIAL HYPERTENSION: ICD-10-CM

## 2025-02-11 PROCEDURE — 93000 ELECTROCARDIOGRAM COMPLETE: CPT | Performed by: STUDENT IN AN ORGANIZED HEALTH CARE EDUCATION/TRAINING PROGRAM

## 2025-02-11 PROCEDURE — 3078F DIAST BP <80 MM HG: CPT | Performed by: STUDENT IN AN ORGANIZED HEALTH CARE EDUCATION/TRAINING PROGRAM

## 2025-02-11 PROCEDURE — 93880 EXTRACRANIAL BILAT STUDY: CPT

## 2025-02-11 PROCEDURE — 3075F SYST BP GE 130 - 139MM HG: CPT | Performed by: STUDENT IN AN ORGANIZED HEALTH CARE EDUCATION/TRAINING PROGRAM

## 2025-02-11 PROCEDURE — 1160F RVW MEDS BY RX/DR IN RCRD: CPT | Performed by: STUDENT IN AN ORGANIZED HEALTH CARE EDUCATION/TRAINING PROGRAM

## 2025-02-11 PROCEDURE — 1159F MED LIST DOCD IN RCRD: CPT | Performed by: STUDENT IN AN ORGANIZED HEALTH CARE EDUCATION/TRAINING PROGRAM

## 2025-02-11 PROCEDURE — 99214 OFFICE O/P EST MOD 30 MIN: CPT | Performed by: STUDENT IN AN ORGANIZED HEALTH CARE EDUCATION/TRAINING PROGRAM

## 2025-02-11 NOTE — PROGRESS NOTES
February 11, 2025    Hello, may I speak with Candida Powell?    My name is rosaura      I am  with [facility]  [address], [address]  [place] [id number] [phone].    Before we get started may I verify your date of birth? 1960    I am calling to  ask about your recent visit. Is this a good time to talk? yes    Tell me about your visit with us. What things went well?  everything went ok        We're always looking for ways to make our patients' experiences even better. Do you have recommendations on ways we may improve?  no    Overall were you satisfied with your visit to our practice? yes       I appreciate you taking the time to speak with me today. Is there anything else I can do for you? no      Thank you, and have a great day.

## 2025-02-11 NOTE — PROGRESS NOTES
"Chief Complaint  Rapid Heart Rate    Subjective        History of Present Illness    EP Problems:  1.  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 Typical atrial flutter  -CTI flutter ablation 11/24/23    Cardiology Problems:  1.  Hypertension  2.  Hyperlipidemia     Medical Problems:  1.  Prior CVA  -2019     History of Present Illness  The patient is a 64-year-old female returning to the clinic for follow-up of atrial flutter and supraventricular tachycardia (SVT).    She reports experiencing a single episode of rapid heart rate upon awakening in the early morning. This episode was successfully managed with her prescribed medication, after which she resumed her sleep without further complications. She does not have access to home EKG monitoring.    She is currently on Xarelto and has exhausted her supply, necessitating a refill.    MEDICATIONS  Xarelto      Objective   Vital Signs:  /72   Pulse 65   Ht 162.6 cm (64\")   Wt 61.2 kg (135 lb)   BMI 23.17 kg/m²   Estimated body mass index is 23.17 kg/m² as calculated from the following:    Height as of this encounter: 162.6 cm (64\").    Weight as of this encounter: 61.2 kg (135 lb).      Physical Exam  Vitals reviewed.   Constitutional:       Appearance: Normal appearance.   Cardiovascular:      Rate and Rhythm: Normal rate and regular rhythm.      Pulses: Normal pulses.      Heart sounds: Normal heart sounds. No murmur heard.  Pulmonary:      Effort: Pulmonary effort is normal. No respiratory distress.      Breath sounds: Normal breath sounds.   Skin:     General: Skin is warm and dry.   Neurological:      General: No focal deficit present.      Mental Status: She is alert.   Psychiatric:         Mood and Affect: Mood normal.         Judgment: Judgment normal.          Physical Exam      Result Review :  The following data was reviewed by: Arjun Welch MD on today's date:      ECG 12 Lead    Date/Time: 2/11/2025 10:31 AM  Performed by: Arjun Welch, " MD    Authorized by: Arjun Welch MD  Comparison: compared with previous ECG from 8/8/2024  Rhythm: sinus rhythm  Rate: normal  Conduction: conduction normal  QRS axis: normal  Other findings: non-specific ST-T wave changes    Clinical impression: non-specific ECG              Assessment and Plan   Diagnoses and all orders for this visit:    1. AVNRT (AV dangelo re-entry tachycardia) (Primary)    2. Typical atrial flutter  -     rivaroxaban (Xarelto) 20 MG tablet; Take 1 tablet by mouth Daily.  Dispense: 90 tablet; Refill: 3    3. Palpitations        Assessment & Plan  1. Atrial flutter  - Reported one episode of rapid heartbeat in November 2023, resolved after medication  - Concern for potential atrial fibrillation given age and symptoms  - Advised to acquire a Kardia monitor or Apple watch for home EKG monitoring  - Prescription for a 90-day supply of Xarelto    2. Supraventricular tachycardia (SVT)  - Recurrence of rapid heartbeat raises concerns for SVT  - Recommended home EKG monitoring with a Kardia monitor or Apple watch to capture abnormal rhythms  - Continue current medication regimen, including Xarelto, until further evaluation    Follow-up  - Patient to follow up in 6 months         Follow Up   Return in about 6 months (around 8/11/2025).  Patient was given instructions and counseling regarding her condition or for health maintenance advice. Please see specific information pulled into the AVS if appropriate.     Part of this note may be an electronic transcription/translation of spoken language to printed text using the Dragon Dictation System.    Patient or patient representative verbalized consent for the use of Ambient Listening during the visit with  Arjun Welch MD for chart documentation. 2/11/2025  10:31 CST

## 2025-02-11 NOTE — PROGRESS NOTES
"2/11/2025        Fidel Hoskins MD  [address] [place] [address]      Candida Powell  1960    Chief Complaint   Patient presents with    Bilateral carotid artery stenosis     Testing this am was done. No complaints and no stroke symtptoms       Dear Fidel Hoskins MD        HPI  I had the pleasure of seeing your patient Candida Powell in the office today.    As you recall, Candida Powell is a 64 y.o.  female who you are following for routine health maintenance.  Currently she is doing well and denies any strokelike symptoms.  She is maintained on Xarelto and Crestor.  She did have noninvasive testing performed today, which I did review in office.        Review of Systems   Constitutional: Negative.    HENT: Negative.     Eyes: Negative.    Respiratory: Negative.     Cardiovascular: Negative.    Gastrointestinal: Negative.    Endocrine: Negative.    Genitourinary: Negative.    Musculoskeletal: Negative.    Skin: Negative.    Allergic/Immunologic: Negative.    Neurological: Negative.    Hematological: Negative.    Psychiatric/Behavioral: Negative.     All other systems reviewed and are negative.        /68   Pulse 96   Ht 162.6 cm (64\")   Wt 62 kg (136 lb 9.6 oz)   SpO2 97%   BMI 23.45 kg/m²    Physical Exam  Vitals and nursing note reviewed.   Constitutional:       Appearance: Normal appearance. She is well-developed and normal weight.   HENT:      Head: Normocephalic and atraumatic.   Eyes:      General: No scleral icterus.     Pupils: Pupils are equal, round, and reactive to light.   Neck:      Thyroid: No thyromegaly.      Vascular: No carotid bruit or JVD.   Cardiovascular:      Rate and Rhythm: Normal rate and regular rhythm.      Pulses:           Carotid pulses are 2+ on the right side and 2+ on the left side.       Femoral pulses are 2+ on the right side and 2+ on the left side.       Popliteal pulses are 2+ on the right side and 2+ on the left side.        " Dorsalis pedis pulses are 2+ on the right side and 2+ on the left side.        Posterior tibial pulses are 2+ on the right side and 2+ on the left side.      Heart sounds: Normal heart sounds.   Pulmonary:      Effort: Pulmonary effort is normal.      Breath sounds: Normal breath sounds.   Abdominal:      General: Bowel sounds are normal. There is no distension or abdominal bruit.      Palpations: Abdomen is soft. There is no mass.      Tenderness: There is no abdominal tenderness.   Musculoskeletal:         General: Normal range of motion.      Cervical back: Neck supple.   Lymphadenopathy:      Cervical: No cervical adenopathy.   Skin:     General: Skin is warm and dry.   Neurological:      General: No focal deficit present.      Mental Status: She is alert and oriented to person, place, and time.      Cranial Nerves: No cranial nerve deficit.      Sensory: No sensory deficit.   Psychiatric:         Mood and Affect: Mood normal.         Behavior: Behavior normal.         Thought Content: Thought content normal.         Judgment: Judgment normal.        Diagnostic data:  Noninvasive testing including a carotid duplex shows less than 50% carotid stenosis bilaterally with bilateral antegrade vertebral flow      Patient Active Problem List   Diagnosis    Brainstem stroke    Mixed hyperlipidemia    Cardiac arrhythmia    Hypertension    Atypical facial pain    Sustained SVT    AVNRT (AV dangelo re-entry tachycardia)    Typical atrial flutter         ICD-10-CM ICD-9-CM   1. Bilateral carotid artery stenosis  I65.23 433.10     433.30   2. Mixed hyperlipidemia  E78.2 272.2   3. Essential hypertension  I10 401.9           Plan: After thoroughly evaluating Candida Powell, I believe the best course of action is to remain conservative from vascular surgery standpoint.  Currently she is doing well and denies any strokelike symptoms.  I did review her testing which shows less than 50% carotid stenosis bilaterally.  We will see  her back in 1 year with repeat noninvasive testing for continued surveillance, including a carotid duplex.  I did discuss vascular risk factors as they pertain to the progression of vascular disease including controlling her hypertension and hyperlipidemia.  Her blood pressure is stable on her current medications.  She should continue her Xarelto 20 mg daily and Crestor 5 mg daily in addition to her other medications.  This was all discussed in full with complete understanding.    Thank you for allowing me to participate in the care of your patient.  Please do not hesitate with any questions or concerns.  I will keep you aware of any further encounters with Candida Powell.        Sincerely yours,         Giovanny Payne, DO

## 2025-02-11 NOTE — PATIENT INSTRUCTIONS
1.  Consider either a home iMovedia device or an Apple watch for home rhythm monitoring  2.  No medication changes for now

## 2025-02-11 NOTE — LETTER
"February 11, 2025     Fidel Hoskins MD  [address]  [place] [address]    Patient: Candida Powell   YOB: 1960   Date of Visit: 2/11/2025     Dear Fidel Hoskins MD:       Thank you for referring Candida Powell to me for evaluation. Below are the relevant portions of my assessment and plan of care.    If you have questions, please do not hesitate to call me. I look forward to following Candida along with you.         Sincerely,        Giovanny Payne DO        CC: No Recipients    Giovanny Payne DO  02/11/25 0822  Sign when Signing Visit  2/11/2025        Fidel Hoskins MD  [address] [place] [address]      Candida Powell  1960    Chief Complaint   Patient presents with   • Bilateral carotid artery stenosis     Testing this am was done. No complaints and no stroke symtptoms       Dear Fidel Hoskins MD        HPI  I had the pleasure of seeing your patient Candida Powell in the office today.    As you recall, Candida Powell is a 64 y.o.  female who you are following for routine health maintenance.  Currently she is doing well and denies any strokelike symptoms.  She is maintained on Xarelto and Crestor.  She did have noninvasive testing performed today, which I did review in office.        Review of Systems   Constitutional: Negative.    HENT: Negative.     Eyes: Negative.    Respiratory: Negative.     Cardiovascular: Negative.    Gastrointestinal: Negative.    Endocrine: Negative.    Genitourinary: Negative.    Musculoskeletal: Negative.    Skin: Negative.    Allergic/Immunologic: Negative.    Neurological: Negative.    Hematological: Negative.    Psychiatric/Behavioral: Negative.     All other systems reviewed and are negative.        /68   Pulse 96   Ht 162.6 cm (64\")   Wt 62 kg (136 lb 9.6 oz)   SpO2 97%   BMI 23.45 kg/m²    Physical Exam  Vitals and nursing note reviewed.   Constitutional:       Appearance: Normal appearance. " She is well-developed and normal weight.   HENT:      Head: Normocephalic and atraumatic.   Eyes:      General: No scleral icterus.     Pupils: Pupils are equal, round, and reactive to light.   Neck:      Thyroid: No thyromegaly.      Vascular: No carotid bruit or JVD.   Cardiovascular:      Rate and Rhythm: Normal rate and regular rhythm.      Pulses:           Carotid pulses are 2+ on the right side and 2+ on the left side.       Femoral pulses are 2+ on the right side and 2+ on the left side.       Popliteal pulses are 2+ on the right side and 2+ on the left side.        Dorsalis pedis pulses are 2+ on the right side and 2+ on the left side.        Posterior tibial pulses are 2+ on the right side and 2+ on the left side.      Heart sounds: Normal heart sounds.   Pulmonary:      Effort: Pulmonary effort is normal.      Breath sounds: Normal breath sounds.   Abdominal:      General: Bowel sounds are normal. There is no distension or abdominal bruit.      Palpations: Abdomen is soft. There is no mass.      Tenderness: There is no abdominal tenderness.   Musculoskeletal:         General: Normal range of motion.      Cervical back: Neck supple.   Lymphadenopathy:      Cervical: No cervical adenopathy.   Skin:     General: Skin is warm and dry.   Neurological:      General: No focal deficit present.      Mental Status: She is alert and oriented to person, place, and time.      Cranial Nerves: No cranial nerve deficit.      Sensory: No sensory deficit.   Psychiatric:         Mood and Affect: Mood normal.         Behavior: Behavior normal.         Thought Content: Thought content normal.         Judgment: Judgment normal.        Diagnostic data:  Noninvasive testing including a carotid duplex shows less than 50% carotid stenosis bilaterally with bilateral antegrade vertebral flow      Patient Active Problem List   Diagnosis   • Brainstem stroke   • Mixed hyperlipidemia   • Cardiac arrhythmia   • Hypertension   • Atypical  facial pain   • Sustained SVT   • AVNRT (AV dangelo re-entry tachycardia)   • Typical atrial flutter         ICD-10-CM ICD-9-CM   1. Bilateral carotid artery stenosis  I65.23 433.10     433.30   2. Mixed hyperlipidemia  E78.2 272.2   3. Essential hypertension  I10 401.9           Plan: After thoroughly evaluating Candida Powell, I believe the best course of action is to remain conservative from vascular surgery standpoint.  Currently she is doing well and denies any strokelike symptoms.  I did review her testing which shows less than 50% carotid stenosis bilaterally.  We will see her back in 1 year with repeat noninvasive testing for continued surveillance, including a carotid duplex.  I did discuss vascular risk factors as they pertain to the progression of vascular disease including controlling her hypertension and hyperlipidemia.  Her blood pressure is stable on her current medications.  She should continue her Xarelto 20 mg daily and Crestor 5 mg daily in addition to her other medications.  This was all discussed in full with complete understanding.    Thank you for allowing me to participate in the care of your patient.  Please do not hesitate with any questions or concerns.  I will keep you aware of any further encounters with Candida Powell.        Sincerely yours,         Giovanny Payne, DO

## 2025-08-13 ENCOUNTER — OFFICE VISIT (OUTPATIENT)
Dept: CARDIOLOGY | Facility: CLINIC | Age: 65
End: 2025-08-13
Payer: MEDICARE

## 2025-08-13 VITALS
WEIGHT: 152 LBS | OXYGEN SATURATION: 99 % | BODY MASS INDEX: 25.95 KG/M2 | HEART RATE: 67 BPM | HEIGHT: 64 IN | DIASTOLIC BLOOD PRESSURE: 86 MMHG | SYSTOLIC BLOOD PRESSURE: 112 MMHG

## 2025-08-13 DIAGNOSIS — R00.2 PALPITATIONS: ICD-10-CM

## 2025-08-13 DIAGNOSIS — I48.3 TYPICAL ATRIAL FLUTTER: Primary | ICD-10-CM

## 2025-08-13 DIAGNOSIS — I47.19 AVNRT (AV NODAL RE-ENTRY TACHYCARDIA): ICD-10-CM

## 2025-08-13 PROCEDURE — 99214 OFFICE O/P EST MOD 30 MIN: CPT

## 2025-08-13 PROCEDURE — 93000 ELECTROCARDIOGRAM COMPLETE: CPT

## 2025-08-13 PROCEDURE — 3074F SYST BP LT 130 MM HG: CPT

## 2025-08-13 PROCEDURE — 3079F DIAST BP 80-89 MM HG: CPT

## (undated) DEVICE — INTRO STEER AGILIS NXT MED/CURL 8.5F

## (undated) DEVICE — Device: Brand: WEBSTER

## (undated) DEVICE — SI AVANTI+ 10F STD W/GW: Brand: AVANTI

## (undated) DEVICE — SUREFIT, DUAL DISPERSIVE ELECTRODE, CONTACT QUALITY MONITOR: Brand: SUREFIT

## (undated) DEVICE — KT NDL GUIDE STRL 18GA

## (undated) DEVICE — Device: Brand: THERMOCOOL SMARTTOUCH SF

## (undated) DEVICE — Device: Brand: SMARTABLATE

## (undated) DEVICE — STERILE (15.2 TAPERED TO 7.6 X 183CM) POLYETHYLENE ACCORDION-FOLDED COVER FOR USE WITH SIEMENS ACUNAV ULTRASOUND CATHETER FAMILY CONNECTOR: Brand: SWIFTLINK TRANSDUCER COVER

## (undated) DEVICE — SI AVANTI+ 8F STD W/GW  NO OBT: Brand: AVANTI

## (undated) DEVICE — PAD, DEFIB, ADULT, RADIOTRANS, PHYSIO: Brand: MEDLINE

## (undated) DEVICE — SOL IRR NACL 0.9PCT BT 1000ML

## (undated) DEVICE — SOLIDIFIER LIQUI LOC PLUS 2000CC

## (undated) DEVICE — SI AVANTI+ 7F STD W/GW  NO OBT: Brand: AVANTI

## (undated) DEVICE — TBG PRESS/MONITR FIX M/F LL A/ 48IN STRL

## (undated) DEVICE — Device: Brand: SOUNDSTAR

## (undated) DEVICE — SYS CLS VASC/VENI VASCADE MVP 6TO12F

## (undated) DEVICE — Device: Brand: WEBSTER CS

## (undated) DEVICE — SI AVANTI+ 6F STD W/GW  NO OBT: Brand: AVANTI

## (undated) DEVICE — Device: Brand: REFERENCE PATCH CARTO 3

## (undated) DEVICE — STPCK 3/WY HP M/RA W/OFF/HNDL 1050PSI STRL

## (undated) DEVICE — PRESSURE MONITORING SET: Brand: TRUWAVE

## (undated) DEVICE — PK CATH CARD 30 CA/4

## (undated) DEVICE — SYS COL WAST NAMIC IV SGL/LN FML/FIT W/VNT/SPK/HD 72IN